# Patient Record
Sex: FEMALE | Race: WHITE | NOT HISPANIC OR LATINO | Employment: FULL TIME | URBAN - METROPOLITAN AREA
[De-identification: names, ages, dates, MRNs, and addresses within clinical notes are randomized per-mention and may not be internally consistent; named-entity substitution may affect disease eponyms.]

---

## 2023-05-04 ENCOUNTER — OFFICE VISIT (OUTPATIENT)
Dept: PRIMARY CARE | Facility: CLINIC | Age: 53
End: 2023-05-04
Payer: COMMERCIAL

## 2023-05-04 VITALS
HEART RATE: 76 BPM | BODY MASS INDEX: 27.1 KG/M2 | WEIGHT: 168.6 LBS | DIASTOLIC BLOOD PRESSURE: 87 MMHG | TEMPERATURE: 97.3 F | HEIGHT: 66 IN | SYSTOLIC BLOOD PRESSURE: 123 MMHG

## 2023-05-04 DIAGNOSIS — Z01.818 PREOP EXAMINATION: Primary | ICD-10-CM

## 2023-05-04 DIAGNOSIS — Z13.21 ENCOUNTER FOR VITAMIN DEFICIENCY SCREENING: ICD-10-CM

## 2023-05-04 DIAGNOSIS — Z13.29 SCREENING FOR THYROID DISORDER: ICD-10-CM

## 2023-05-04 DIAGNOSIS — Z00.00 HEALTH MAINTENANCE EXAMINATION: ICD-10-CM

## 2023-05-04 PROCEDURE — 99214 OFFICE O/P EST MOD 30 MIN: CPT | Performed by: NURSE PRACTITIONER

## 2023-05-04 PROCEDURE — 1036F TOBACCO NON-USER: CPT | Performed by: NURSE PRACTITIONER

## 2023-05-04 RX ORDER — POLYETHYLENE GLYCOL 3350, SODIUM CHLORIDE, SODIUM BICARBONATE, POTASSIUM CHLORIDE 420; 11.2; 5.72; 1.48 G/4L; G/4L; G/4L; G/4L
POWDER, FOR SOLUTION ORAL
COMMUNITY
Start: 2023-05-01 | End: 2023-12-04 | Stop reason: WASHOUT

## 2023-05-04 RX ORDER — BALSALAZIDE DISODIUM 750 MG/1
CAPSULE ORAL 3 TIMES DAILY
COMMUNITY
Start: 2021-02-01

## 2023-05-04 NOTE — PROGRESS NOTES
Subjective Korinne Peetz is a 52 y.o. female who presents to the office today for a preoperative consultation at the request of surgeon Leonid who plans on performing Right hip total hip on May 22. This consultation is requested for the specific conditions prompting preoperative evaluation (i.e. because of potential effect on operative risk):  The patient has the following known anesthesia issues:  None . Patients bleeding risk: no recent abnormal bleeding. Patient does not have objections to receiving blood products if needed.  To have colonoscopy in August.    Review of Systems   All other systems reviewed and are negative.    Objective   Physical Exam  Vitals and nursing note reviewed.   Constitutional:       Appearance: Normal appearance. She is obese.   HENT:      Head: Normocephalic and atraumatic.      Right Ear: Tympanic membrane, ear canal and external ear normal.      Left Ear: Tympanic membrane, ear canal and external ear normal.      Nose: Nose normal.      Mouth/Throat:      Mouth: Mucous membranes are moist.      Pharynx: Oropharynx is clear.   Eyes:      Conjunctiva/sclera: Conjunctivae normal.      Pupils: Pupils are equal, round, and reactive to light.   Neck:      Thyroid: No thyroid mass, thyromegaly or thyroid tenderness.   Cardiovascular:      Rate and Rhythm: Normal rate and regular rhythm.      Pulses: Normal pulses.      Heart sounds: Normal heart sounds.   Pulmonary:      Effort: Pulmonary effort is normal.      Breath sounds: Normal breath sounds.   Abdominal:      General: Bowel sounds are normal.      Palpations: Abdomen is soft.   Genitourinary:     Comments: Defer  Musculoskeletal:      Cervical back: Normal range of motion and neck supple.      Comments: Right hip with stiffness and decreased ROM noted   Lymphadenopathy:      Cervical: No cervical adenopathy.   Skin:     General: Skin is warm.      Capillary Refill: Capillary refill takes 2 to 3 seconds.      Findings: No rash.    Neurological:      Mental Status: She is alert and oriented to person, place, and time. Mental status is at baseline.   Psychiatric:         Mood and Affect: Mood normal.         Behavior: Behavior normal.         Thought Content: Thought content normal.         Judgment: Judgment normal.          Predictors of intubation difficulty:   Morbid obesity? no   Anatomically abnormal facies? no   Prominent incisors? no   Receding mandible? no   Short, thick neck? no   Neck range of motion: normal   Mallampati score: II (hard and soft palate, upper portion of tonsils anduvula visible)   Dentition: No chipped, loose, or missing teeth.        Lab Review   Labs ordered today    Assessment/Plan   52 y.o. female with planned surgery as above.    Known risk factors for perioperative complications: None    Difficulty with intubation is not anticipated.    Cardiac Risk Estimation: Low    Current medications which may produce withdrawal symptoms if withheld perioperatively: N/A    1. Preoperative workup as follows hemoglobin, hematocrit, electrolytes, creatinine, glucose, liver function studies, coagulation studies.  2. Change in medication regimen before surgery:  discontinue NSAIDs 14 days before surgery.  3. Prophylaxis for cardiac events with perioperative beta-blockers: should be considered, specific regimen per anesthesia.  4. Invasive hemodynamic monitoring perioperatively: not indicated.  5. Deep vein thrombosis prophylaxis postoperatively:regimen to be chosen by surgical team.  6. Surveillance for postoperative MI with ECG immediately postoperatively and on postoperative days 1 and 2 AND troponin levels 24 hours postoperatively and on day 4 or hospital discharge (whichever comes first): not indicated.  7. Other measures: N/A

## 2023-05-06 ENCOUNTER — LAB (OUTPATIENT)
Dept: LAB | Facility: LAB | Age: 53
End: 2023-05-06
Payer: COMMERCIAL

## 2023-05-06 DIAGNOSIS — Z13.29 SCREENING FOR THYROID DISORDER: ICD-10-CM

## 2023-05-06 DIAGNOSIS — Z00.00 HEALTH MAINTENANCE EXAMINATION: ICD-10-CM

## 2023-05-06 DIAGNOSIS — Z13.21 ENCOUNTER FOR VITAMIN DEFICIENCY SCREENING: ICD-10-CM

## 2023-05-06 DIAGNOSIS — Z01.818 PREOP EXAMINATION: ICD-10-CM

## 2023-05-06 LAB
ALANINE AMINOTRANSFERASE (SGPT) (U/L) IN SER/PLAS: 7 U/L (ref 7–45)
ALBUMIN (G/DL) IN SER/PLAS: 4.5 G/DL (ref 3.4–5)
ALKALINE PHOSPHATASE (U/L) IN SER/PLAS: 52 U/L (ref 33–110)
ANION GAP IN SER/PLAS: 11 MMOL/L (ref 10–20)
ASPARTATE AMINOTRANSFERASE (SGOT) (U/L) IN SER/PLAS: 15 U/L (ref 9–39)
BASOPHILS (10*3/UL) IN BLOOD BY AUTOMATED COUNT: 0.01 X10E9/L (ref 0–0.1)
BASOPHILS/100 LEUKOCYTES IN BLOOD BY AUTOMATED COUNT: 0.2 % (ref 0–2)
BILIRUBIN TOTAL (MG/DL) IN SER/PLAS: 0.7 MG/DL (ref 0–1.2)
CALCIDIOL (25 OH VITAMIN D3) (NG/ML) IN SER/PLAS: 42 NG/ML
CALCIUM (MG/DL) IN SER/PLAS: 9.7 MG/DL (ref 8.6–10.6)
CARBON DIOXIDE, TOTAL (MMOL/L) IN SER/PLAS: 29 MMOL/L (ref 21–32)
CHLORIDE (MMOL/L) IN SER/PLAS: 104 MMOL/L (ref 98–107)
CHOLESTEROL (MG/DL) IN SER/PLAS: 226 MG/DL (ref 0–199)
CHOLESTEROL IN HDL (MG/DL) IN SER/PLAS: 58.7 MG/DL
CHOLESTEROL/HDL RATIO: 3.9
CREATININE (MG/DL) IN SER/PLAS: 0.67 MG/DL (ref 0.5–1.05)
EOSINOPHILS (10*3/UL) IN BLOOD BY AUTOMATED COUNT: 0.06 X10E9/L (ref 0–0.7)
EOSINOPHILS/100 LEUKOCYTES IN BLOOD BY AUTOMATED COUNT: 1.3 % (ref 0–6)
ERYTHROCYTE DISTRIBUTION WIDTH (RATIO) BY AUTOMATED COUNT: 12.2 % (ref 11.5–14.5)
ERYTHROCYTE MEAN CORPUSCULAR HEMOGLOBIN CONCENTRATION (G/DL) BY AUTOMATED: 33.2 G/DL (ref 32–36)
ERYTHROCYTE MEAN CORPUSCULAR VOLUME (FL) BY AUTOMATED COUNT: 94 FL (ref 80–100)
ERYTHROCYTES (10*6/UL) IN BLOOD BY AUTOMATED COUNT: 4.57 X10E12/L (ref 4–5.2)
GFR FEMALE: >90 ML/MIN/1.73M2
GLUCOSE (MG/DL) IN SER/PLAS: 93 MG/DL (ref 74–99)
HEMATOCRIT (%) IN BLOOD BY AUTOMATED COUNT: 42.8 % (ref 36–46)
HEMOGLOBIN (G/DL) IN BLOOD: 14.2 G/DL (ref 12–16)
IMMATURE GRANULOCYTES/100 LEUKOCYTES IN BLOOD BY AUTOMATED COUNT: 0.2 % (ref 0–0.9)
INR IN PPP BY COAGULATION ASSAY: 1 (ref 0.9–1.1)
LDL: 150 MG/DL (ref 0–99)
LEUKOCYTES (10*3/UL) IN BLOOD BY AUTOMATED COUNT: 4.7 X10E9/L (ref 4.4–11.3)
LYMPHOCYTES (10*3/UL) IN BLOOD BY AUTOMATED COUNT: 1.85 X10E9/L (ref 1.2–4.8)
LYMPHOCYTES/100 LEUKOCYTES IN BLOOD BY AUTOMATED COUNT: 39.8 % (ref 13–44)
MONOCYTES (10*3/UL) IN BLOOD BY AUTOMATED COUNT: 0.45 X10E9/L (ref 0.1–1)
MONOCYTES/100 LEUKOCYTES IN BLOOD BY AUTOMATED COUNT: 9.7 % (ref 2–10)
NEUTROPHILS (10*3/UL) IN BLOOD BY AUTOMATED COUNT: 2.27 X10E9/L (ref 1.2–7.7)
NEUTROPHILS/100 LEUKOCYTES IN BLOOD BY AUTOMATED COUNT: 48.8 % (ref 40–80)
NRBC (PER 100 WBCS) BY AUTOMATED COUNT: 0 /100 WBC (ref 0–0)
PLATELETS (10*3/UL) IN BLOOD AUTOMATED COUNT: 328 X10E9/L (ref 150–450)
POTASSIUM (MMOL/L) IN SER/PLAS: 4 MMOL/L (ref 3.5–5.3)
PROTEIN TOTAL: 6.9 G/DL (ref 6.4–8.2)
PROTHROMBIN TIME (PT) IN PPP BY COAGULATION ASSAY: 12 SEC (ref 9.8–13.4)
SODIUM (MMOL/L) IN SER/PLAS: 140 MMOL/L (ref 136–145)
THYROTROPIN (MIU/L) IN SER/PLAS BY DETECTION LIMIT <= 0.05 MIU/L: 1.59 MIU/L (ref 0.44–3.98)
TRIGLYCERIDE (MG/DL) IN SER/PLAS: 86 MG/DL (ref 0–149)
UREA NITROGEN (MG/DL) IN SER/PLAS: 12 MG/DL (ref 6–23)
VLDL: 17 MG/DL (ref 0–40)

## 2023-05-06 PROCEDURE — 80061 LIPID PANEL: CPT

## 2023-05-06 PROCEDURE — 82306 VITAMIN D 25 HYDROXY: CPT

## 2023-05-06 PROCEDURE — 36415 COLL VENOUS BLD VENIPUNCTURE: CPT

## 2023-05-06 PROCEDURE — 85610 PROTHROMBIN TIME: CPT

## 2023-05-06 PROCEDURE — 85025 COMPLETE CBC W/AUTO DIFF WBC: CPT

## 2023-05-06 PROCEDURE — 84443 ASSAY THYROID STIM HORMONE: CPT

## 2023-05-06 PROCEDURE — 80053 COMPREHEN METABOLIC PANEL: CPT

## 2023-05-09 PROBLEM — Z00.00 HEALTH MAINTENANCE EXAMINATION: Status: ACTIVE | Noted: 2023-05-09

## 2023-05-09 PROBLEM — Z01.818 PREOP EXAMINATION: Status: ACTIVE | Noted: 2023-05-09

## 2023-05-09 PROBLEM — Z13.29 SCREENING FOR THYROID DISORDER: Status: ACTIVE | Noted: 2023-05-09

## 2023-05-09 PROBLEM — Z13.21 ENCOUNTER FOR VITAMIN DEFICIENCY SCREENING: Status: ACTIVE | Noted: 2023-05-09

## 2023-05-15 ENCOUNTER — TELEPHONE (OUTPATIENT)
Dept: PRIMARY CARE | Facility: CLINIC | Age: 53
End: 2023-05-15
Payer: COMMERCIAL

## 2023-05-15 PROBLEM — M16.9 OSTEOARTHRITIS OF HIP: Status: ACTIVE | Noted: 2023-05-15

## 2023-05-15 PROBLEM — E55.9 VITAMIN D DEFICIENCY: Status: ACTIVE | Noted: 2023-05-15

## 2023-05-15 PROBLEM — M25.551 PAIN IN RIGHT HIP: Status: ACTIVE | Noted: 2019-12-10

## 2023-05-15 PROBLEM — M76.01 GLUTEAL TENDINITIS OF RIGHT BUTTOCK: Status: ACTIVE | Noted: 2023-05-15

## 2023-05-15 PROBLEM — M25.50 JOINT PAIN: Status: ACTIVE | Noted: 2023-05-15

## 2023-05-15 PROBLEM — R19.4 CHANGE IN BOWEL HABIT: Status: ACTIVE | Noted: 2023-05-15

## 2023-05-15 PROBLEM — F41.9 ANXIETY: Status: ACTIVE | Noted: 2023-05-15

## 2023-05-15 PROBLEM — K51.90 ULCERATIVE COLITIS (MULTI): Status: ACTIVE | Noted: 2023-05-15

## 2023-05-15 RX ORDER — CHLORHEXIDINE GLUCONATE ORAL RINSE 1.2 MG/ML
SOLUTION DENTAL
COMMUNITY
Start: 2023-05-05 | End: 2023-12-04 | Stop reason: WASHOUT

## 2023-05-15 NOTE — TELEPHONE ENCOUNTER
Left message with Dr Jaquez's office to let us know if they did not receive the pre-op clearance for Korinne and I will fax over her OV/labs

## 2023-05-15 NOTE — TELEPHONE ENCOUNTER
----- Message from LEANDRA Arriaga sent at 5/14/2023 10:31 PM EDT -----  Regarding: Surgical Clearance  Please reach out to Dr. Jaquez's office and be sure that they got her pre-op clearance.  THANKS  OK to fax note and labs if they did not.

## 2023-05-22 ENCOUNTER — HOSPITAL ENCOUNTER (OUTPATIENT)
Dept: DATA CONVERSION | Facility: HOSPITAL | Age: 53
End: 2023-05-22
Attending: ORTHOPAEDIC SURGERY | Admitting: ORTHOPAEDIC SURGERY
Payer: COMMERCIAL

## 2023-05-22 DIAGNOSIS — Z90.710 ACQUIRED ABSENCE OF BOTH CERVIX AND UTERUS: ICD-10-CM

## 2023-05-22 DIAGNOSIS — M25.551 PAIN IN RIGHT HIP: ICD-10-CM

## 2023-05-22 DIAGNOSIS — F41.9 ANXIETY DISORDER, UNSPECIFIED: ICD-10-CM

## 2023-05-22 DIAGNOSIS — M16.11 UNILATERAL PRIMARY OSTEOARTHRITIS, RIGHT HIP: ICD-10-CM

## 2023-06-20 LAB
COMPLETE PATHOLOGY REPORT: NORMAL
CONVERTED CLINICAL DIAGNOSIS-HISTORY: NORMAL
CONVERTED FINAL DIAGNOSIS: NORMAL
CONVERTED FINAL REPORT PDF LINK TO COPY AND PASTE: NORMAL
CONVERTED GROSS DESCRIPTION: NORMAL

## 2023-08-01 PROBLEM — Z96.649 S/P HIP REPLACEMENT: Status: ACTIVE | Noted: 2023-08-01

## 2023-08-01 PROBLEM — M22.8X2: Status: ACTIVE | Noted: 2023-08-01

## 2023-08-01 PROBLEM — H11.31 SUBCONJUNCTIVAL HEMORRHAGE OF RIGHT EYE: Status: RESOLVED | Noted: 2023-08-01 | Resolved: 2023-08-01

## 2023-08-01 RX ORDER — GLUCOSAM/CHONDRO/HERB 149/HYAL 750-100 MG
TABLET ORAL
COMMUNITY
Start: 2021-06-08 | End: 2023-12-04 | Stop reason: WASHOUT

## 2023-08-02 ENCOUNTER — OFFICE VISIT (OUTPATIENT)
Dept: PRIMARY CARE | Facility: CLINIC | Age: 53
End: 2023-08-02
Payer: COMMERCIAL

## 2023-08-02 VITALS
WEIGHT: 170 LBS | DIASTOLIC BLOOD PRESSURE: 80 MMHG | HEIGHT: 66 IN | TEMPERATURE: 98.3 F | SYSTOLIC BLOOD PRESSURE: 124 MMHG | BODY MASS INDEX: 27.32 KG/M2

## 2023-08-02 DIAGNOSIS — M25.561 CHRONIC PAIN OF BOTH KNEES: Primary | ICD-10-CM

## 2023-08-02 DIAGNOSIS — G89.29 CHRONIC PAIN OF BOTH KNEES: Primary | ICD-10-CM

## 2023-08-02 DIAGNOSIS — M25.562 CHRONIC PAIN OF BOTH KNEES: Primary | ICD-10-CM

## 2023-08-02 PROCEDURE — 1036F TOBACCO NON-USER: CPT | Performed by: NURSE PRACTITIONER

## 2023-08-02 PROCEDURE — 99213 OFFICE O/P EST LOW 20 MIN: CPT | Performed by: NURSE PRACTITIONER

## 2023-08-02 ASSESSMENT — PATIENT HEALTH QUESTIONNAIRE - PHQ9
2. FEELING DOWN, DEPRESSED OR HOPELESS: NOT AT ALL
1. LITTLE INTEREST OR PLEASURE IN DOING THINGS: NOT AT ALL
SUM OF ALL RESPONSES TO PHQ9 QUESTIONS 1 AND 2: 0

## 2023-08-02 NOTE — PROGRESS NOTES
"Subjective   Patient ID: Korinne Peetz is a 53 y.o. female who presents for Knee Pain (bilateral).    HPI   Doing PT downstairs with Tonya  Had hip replacement May 22  Knees are not behaving.  Trouble with squats, lunges.   Knees have been creaky with running.  Steps are OK - a little creakier in the AM  Motrin helps.    Thinks she will need to see Dr. Jaquez.    He did hip replacement.    Review of Systems   All other systems reviewed and are negative.      Objective   /80   Temp 36.8 °C (98.3 °F)   Ht 1.664 m (5' 5.5\")   Wt 77.1 kg (170 lb)   BMI 27.86 kg/m²     Physical Exam  Vitals and nursing note reviewed.   Constitutional:       Appearance: Normal appearance.   Musculoskeletal:         General: Tenderness present.      Comments: Antalgic gait  Decreased ROM   Neurological:      Mental Status: She is alert and oriented to person, place, and time. Mental status is at baseline.   Psychiatric:         Mood and Affect: Mood normal.         Behavior: Behavior normal.         Thought Content: Thought content normal.         Assessment/Plan   Problem List Items Addressed This Visit       Chronic pain of both knees - Primary     REFER to ORTHO     "

## 2023-08-19 PROBLEM — G89.29 CHRONIC PAIN OF BOTH KNEES: Status: ACTIVE | Noted: 2023-08-19

## 2023-08-19 PROBLEM — M25.562 CHRONIC PAIN OF BOTH KNEES: Status: ACTIVE | Noted: 2023-08-19

## 2023-08-19 PROBLEM — M25.561 CHRONIC PAIN OF BOTH KNEES: Status: ACTIVE | Noted: 2023-08-19

## 2023-08-26 PROBLEM — E66.3 OVERWEIGHT WITH BODY MASS INDEX (BMI) OF 26 TO 26.9 IN ADULT: Status: ACTIVE | Noted: 2023-08-26

## 2023-08-26 RX ORDER — AMOXICILLIN 500 MG/1
2000 CAPSULE ORAL
COMMUNITY
End: 2023-12-04 | Stop reason: WASHOUT

## 2023-09-07 VITALS — HEIGHT: 65 IN | WEIGHT: 166.67 LBS | BODY MASS INDEX: 27.77 KG/M2

## 2023-09-30 NOTE — H&P
History & Physical Reviewed:   Pregnant/Lactating:  ·  Are You Pregnant unable to answer   ·  Order Pregnancy Test order urine test   ·  Are You Currently Breastfeeding unable to answer     I have reviewed the History and Physical dated:  02-May-2023   History and Physical reviewed and relevant findings noted. Patient examined to review pertinent physical  findings.: No significant changes   Home Medications Reviewed: no changes noted   Allergies Reviewed: no changes noted       ERAS (Enhanced Recovery After Surgery):  ·  ERAS Patient: no     Consent:   COVID-19 Consent:  ·  COVID-19 Risk Consent Surgeon has reviewed key risks related to the risk of radha COVID-19 and if they contract COVID-19 what the risks are.     Attestation:   Note Completion:  I am a:  Resident/Fellow   Attending Attestation I saw and evaluated the patient.  I personally obtained the key and critical portions of the history and physical exam or was physically present for key and  critical portions performed by the resident/fellow. I reviewed the resident/fellow?s documentation and discussed the patient with the resident/fellow.  I agree with the resident/fellow?s medical decision making as documented in the note.     I personally evaluated the patient on 22-May-2023         Electronic Signatures:  Lucero Gonzalez (DO (Resident))  (Signed 22-May-2023 06:44)   Authored: History & Physical Reviewed, ERAS, Consent,  Note Completion  Miguel Jaquez)  (Signed 22-May-2023 06:58)   Authored: Note Completion   Co-Signer: History & Physical Reviewed, ERAS, Consent, Note Completion      Last Updated: 22-May-2023 06:58 by Miguel Jaquez)

## 2023-10-02 NOTE — OP NOTE
Post Operative Note:     PreOp Diagnosis: Osteoarthritis Right hip   Post-Procedure Diagnosis: Osteoarthritis Right hip   Procedure: 1. Right Direct anterior total hip arthroplasty   2.   3.   4.   5.   Surgeon: Dr. Jaquez   Resident/Fellow/Other Assistant: Saige Gonzalez   Anesthesia: spinal   Estimated Blood Loss (mL): 250cc   Specimen: yes. bone from femoral head   Complications: none   Findings: severe djd R hip   Patient Returned To/Condition: stable/PACU   Implants: genny trident II tritanium 52mm acetabular  shell, 36mm X3 poly, accolade II size 4 x 127 femoral stem, biolox delta ceramic 36m +0mm femoral head     Operative Report Dictated:  Dictation: not applicable - note contains Operative  Report   Operative Report:    Statement of medical necessity:    This is a 52-year-old female with severe degenerative disease of the right hip that has failed non operative management. the risks, benefits and alternatives of total hip arthroplasty were discussed with the patient and they signed informed consent.      DESCRIPTION OF PROCEDURE:  The patient was brought to the operative room, and anesthesia was initiated by the anesthesia team. Appropriate preoperative antibiotics were given. The patient was then secured to the Wynona table in the standard fashion. The patient was prepped and draped  in the usual sterile fashion, a time out was performed, the patient was identified by name and medical record number and the laterality and site of surgery were confirmed by all present.  Standard direct anterior approach to the hip was made. Hemostasis  was obtained with Bovie electrocautery. Anterior and superior capsulectomy was performed in the usual fashion. Femoral neck osteotomy was performed in accordance with the preoperative plan, and femoral head extracted from the acetabulum without difficulty.  There was noted to be loss of articular cartilage from the femoral head and acetabulum as well as osteophyte  formation on both the femoral and acetabular sides.     Periacetabular retractors were placed, with excellent exposure of the acetabulum being obtained. Remnants of the acetabular labrum were excised. The medial wall of the acetabulum was developed with a reamer 5 mm smaller than the preoperative plan. Reaming  then continued circumferentially. The final reamer was line to line with the the definitive implant size. Good quality bone was noted. After irrigation, the acetabular component was inserted with the appropriate degree of abduction and anteversion based  upon anatomic landmarks. Appropriate positioning was confirmed with an AP image of the pelvis and AP of the operative hip using C-arm. After terminal impaction, the highly cross-linked polyethylene liner was inserted into the acetabular component and  locking mechanism engaged in the usual fashion.    Attention was then directed to the femoral side. The femoral elevating hook was inserted. Further soft tissue release was performed to allow anterior translation of the proximal femur. This included release of the obturator internus tendon. The obturator  externus was preserved. After adequate mobilization of the femur, the medullary canal was developed with a curved curette followed by a reverse cutting rasp. The proximal femur is then prepared using sequentially larger broaches up to the definitive implant  size. The final broach was both rotationally and longitudinally stable. The hip was reduced and flouro was used to assess the implant. There was excellent fit and fill of the implant and the leg lengths looked appropriate comparing the lesser tuberosities  to the ischium on both sides. Stability was then tested; with 60 degrees of external rotation of the hip and full extension the implant was stable.     Trial implant was removed from the femur. The definitive implant was inserted. The trunnion of the femoral component was then cleaned and dried, followed  by impaction of the definitive prosthetic femoral head. The hip was reduced with normal findings  again noted.    The wound was irrigated with irrisept solution followed by normal saline. The pericapsular soft tissues were injected with ropivicaine, epinephrine, toradol,  and duramorph. The myofascia was closed using interrupted #1 polysorb, then #2 quill, followed  by skin closure using inverted 2-0 poly, 3-0 v-lock in the subcuticular layer, and perineo dressing.  Mepilex AG silver impregnated dressing was then placed. Patient was awoken from anesthesia by the anesthesia team and brought to the pacu in stable condition    Post operative plan: patient may bear weight as tolerated, anterior hip precautions, antibiotics and dvt prophylaxis per protocol, standard post operative supportive care     Statement of staff presence: I was present during all key and critical portions of the procedure and immediately available during closure.             Attestation:   Note Completion:  I am a: Resident/Fellow   Attending Attestation I was present for key portions of the procedure and the procedure lasted longer than 5 minutes.          Electronic Signatures:  Lucero Gonzalez (DO (Resident))  (Signed 22-May-2023 10:10)   Authored: Post Operative Note, Note Completion  Miguel Jaquez)  (Signed 22-May-2023 13:39)   Authored: Post Operative Note, Note Completion   Co-Signer: Post Operative Note, Note Completion      Last Updated: 22-May-2023 13:39 by Miguel Jaquez)

## 2023-10-04 ENCOUNTER — ANCILLARY PROCEDURE (OUTPATIENT)
Dept: RADIOLOGY | Facility: CLINIC | Age: 53
End: 2023-10-04
Payer: COMMERCIAL

## 2023-10-04 ENCOUNTER — OFFICE VISIT (OUTPATIENT)
Dept: ORTHOPEDIC SURGERY | Facility: CLINIC | Age: 53
End: 2023-10-04
Payer: COMMERCIAL

## 2023-10-04 DIAGNOSIS — M25.551 RIGHT HIP PAIN: Primary | ICD-10-CM

## 2023-10-04 DIAGNOSIS — M25.551 RIGHT HIP PAIN: ICD-10-CM

## 2023-10-04 PROCEDURE — 99213 OFFICE O/P EST LOW 20 MIN: CPT | Performed by: ORTHOPAEDIC SURGERY

## 2023-10-04 PROCEDURE — 73502 X-RAY EXAM HIP UNI 2-3 VIEWS: CPT | Mod: RT

## 2023-10-04 PROCEDURE — 1036F TOBACCO NON-USER: CPT | Performed by: ORTHOPAEDIC SURGERY

## 2023-10-04 PROCEDURE — 73502 X-RAY EXAM HIP UNI 2-3 VIEWS: CPT | Mod: RIGHT SIDE | Performed by: RADIOLOGY

## 2023-10-04 NOTE — PROGRESS NOTES
This is a 53 y.o. female who presents with 4 months out from right total hip.  She is doing very well, she has excellent relief of her preoperative pain no continued pain in the right hip.  No drainage from her incision no fevers no chills.  She is walking without difficulty she is not using any assist devices she has excellent relief of her symptoms.    Physical Exam    Patients' self reported past medical history, medications, allergies, surgical history, family and social history as well as a 10 point  review of systems has been documented in the new patient intake form and scanned into the patient's electronic medical record.  The intake form was reviewed by Dr Jaquez during the initial office visit and signed by Dr. Jaquez and the patient. Pertinent findings are  documented in the HPI.    A complete 10 point review of systems was performed and was negative except as noted above in HPI.     Constitutional: well developed, well nourished female in no acute distress  Psychiatric: normal mood, appropriate affect  Eyes: sclera anicteric  HENT: normocephalic/atraumatic  CV: regular rate and rhythm   Respiratory: non labored breathing  Integumentary: no rash  Neurological: moves all extremities    Right hip examination: Well-healed surgical incision erythema no drainage no pain with range of motion neurovascular tact distally    Xrays were reviewed and interpreted by me today, then show stable right total of arthroplasty no fracture dislocation or loosening    Impression/Plan: This is a 53 y.o. female status post right total hip doing well.  Weightbearing as tolerated activity as tolerated, follow-up 1 year and radiographic follow-up after that

## 2023-10-04 NOTE — LETTER
October 4, 2023     Isabella Corrigan, APRN-CNP  [address]  [facility], [address]  [facility] [address]    Patient: Korinne Peetz   YOB: 1970   Date of Visit: 10/4/2023       Dear Dr. Isabella Corrigan, ANTONIO-CNP:    Thank you for referring Korinne Peetz to me for evaluation. Below are my notes for this consultation.  If you have questions, please do not hesitate to call me. I look forward to following your patient along with you.       Sincerely,     Miguel Jaquez MD      CC: No Recipients  ______________________________________________________________________________________    This is a 53 y.o. female who presents with 4 months out from right total hip.  She is doing very well, she has excellent relief of her preoperative pain no continued pain in the right hip.  No drainage from her incision no fevers no chills.  She is walking without difficulty she is not using any assist devices she has excellent relief of her symptoms.    Physical Exam    Patients' self reported past medical history, medications, allergies, surgical history, family and social history as well as a 10 point  review of systems has been documented in the new patient intake form and scanned into the patient's electronic medical record.  The intake form was reviewed by Dr Jaquez during the initial office visit and signed by Dr. Jaquez and the patient. Pertinent findings are  documented in the HPI.    A complete 10 point review of systems was performed and was negative except as noted above in HPI.     Constitutional: well developed, well nourished female in no acute distress  Psychiatric: normal mood, appropriate affect  Eyes: sclera anicteric  HENT: normocephalic/atraumatic  CV: regular rate and rhythm   Respiratory: non labored breathing  Integumentary: no rash  Neurological: moves all extremities    Right hip examination: Well-healed surgical incision erythema no drainage no pain with range of motion neurovascular tact  distally    Xrays were reviewed and interpreted by me today, then show stable right total of arthroplasty no fracture dislocation or loosening    Impression/Plan: This is a 53 y.o. female status post right total hip doing well.  Weightbearing as tolerated activity as tolerated, follow-up 1 year and radiographic follow-up after that

## 2023-11-29 ENCOUNTER — DOCUMENTATION (OUTPATIENT)
Dept: PHYSICAL THERAPY | Facility: CLINIC | Age: 53
End: 2023-11-29
Payer: COMMERCIAL

## 2023-12-02 ASSESSMENT — ENCOUNTER SYMPTOMS
HEADACHES: 1
MYALGIAS: 1
COUGH: 1
SWEATS: 0
HEARTBURN: 0
HEMOPTYSIS: 0
RHINORRHEA: 1
WEIGHT LOSS: 0
FEVER: 0
WHEEZING: 0
SHORTNESS OF BREATH: 0
SORE THROAT: 1
CHILLS: 0

## 2023-12-04 ENCOUNTER — OFFICE VISIT (OUTPATIENT)
Dept: PRIMARY CARE | Facility: CLINIC | Age: 53
End: 2023-12-04
Payer: COMMERCIAL

## 2023-12-04 ENCOUNTER — LAB (OUTPATIENT)
Dept: LAB | Facility: LAB | Age: 53
End: 2023-12-04
Payer: COMMERCIAL

## 2023-12-04 ENCOUNTER — ANCILLARY PROCEDURE (OUTPATIENT)
Dept: RADIOLOGY | Facility: CLINIC | Age: 53
End: 2023-12-04
Payer: COMMERCIAL

## 2023-12-04 VITALS
DIASTOLIC BLOOD PRESSURE: 78 MMHG | WEIGHT: 168 LBS | SYSTOLIC BLOOD PRESSURE: 118 MMHG | TEMPERATURE: 98.1 F | BODY MASS INDEX: 27.53 KG/M2

## 2023-12-04 DIAGNOSIS — M25.551 RIGHT HIP PAIN: ICD-10-CM

## 2023-12-04 DIAGNOSIS — R05.3 CHRONIC COUGH: ICD-10-CM

## 2023-12-04 DIAGNOSIS — R05.3 CHRONIC COUGH: Primary | ICD-10-CM

## 2023-12-04 DIAGNOSIS — J06.9 URI WITH COUGH AND CONGESTION: ICD-10-CM

## 2023-12-04 LAB
BASOPHILS # BLD AUTO: 0.03 X10*3/UL (ref 0–0.1)
BASOPHILS NFR BLD AUTO: 0.4 %
EOSINOPHIL # BLD AUTO: 0.09 X10*3/UL (ref 0–0.7)
EOSINOPHIL NFR BLD AUTO: 1.3 %
ERYTHROCYTE [DISTWIDTH] IN BLOOD BY AUTOMATED COUNT: 12.7 % (ref 11.5–14.5)
ERYTHROCYTE [SEDIMENTATION RATE] IN BLOOD BY WESTERGREN METHOD: 8 MM/H (ref 0–30)
HCT VFR BLD AUTO: 39.3 % (ref 36–46)
HGB BLD-MCNC: 12.9 G/DL (ref 12–16)
IMM GRANULOCYTES # BLD AUTO: 0.01 X10*3/UL (ref 0–0.7)
IMM GRANULOCYTES NFR BLD AUTO: 0.1 % (ref 0–0.9)
LYMPHOCYTES # BLD AUTO: 2.49 X10*3/UL (ref 1.2–4.8)
LYMPHOCYTES NFR BLD AUTO: 36.1 %
MCH RBC QN AUTO: 31 PG (ref 26–34)
MCHC RBC AUTO-ENTMCNC: 32.8 G/DL (ref 32–36)
MCV RBC AUTO: 95 FL (ref 80–100)
MONOCYTES # BLD AUTO: 0.62 X10*3/UL (ref 0.1–1)
MONOCYTES NFR BLD AUTO: 9 %
NEUTROPHILS # BLD AUTO: 3.66 X10*3/UL (ref 1.2–7.7)
NEUTROPHILS NFR BLD AUTO: 53.1 %
NRBC BLD-RTO: 0 /100 WBCS (ref 0–0)
PLATELET # BLD AUTO: 343 X10*3/UL (ref 150–450)
RBC # BLD AUTO: 4.16 X10*6/UL (ref 4–5.2)
WBC # BLD AUTO: 6.9 X10*3/UL (ref 4.4–11.3)

## 2023-12-04 PROCEDURE — 85025 COMPLETE CBC W/AUTO DIFF WBC: CPT

## 2023-12-04 PROCEDURE — 1036F TOBACCO NON-USER: CPT | Performed by: NURSE PRACTITIONER

## 2023-12-04 PROCEDURE — 36415 COLL VENOUS BLD VENIPUNCTURE: CPT

## 2023-12-04 PROCEDURE — 71046 X-RAY EXAM CHEST 2 VIEWS: CPT | Mod: FY

## 2023-12-04 PROCEDURE — 85652 RBC SED RATE AUTOMATED: CPT

## 2023-12-04 PROCEDURE — 99214 OFFICE O/P EST MOD 30 MIN: CPT | Performed by: NURSE PRACTITIONER

## 2023-12-04 PROCEDURE — 71046 X-RAY EXAM CHEST 2 VIEWS: CPT | Performed by: RADIOLOGY

## 2023-12-04 RX ORDER — ALBUTEROL SULFATE 90 UG/1
1-2 AEROSOL, METERED RESPIRATORY (INHALATION)
COMMUNITY
Start: 2023-11-21 | End: 2024-04-25 | Stop reason: ALTCHOICE

## 2023-12-04 ASSESSMENT — ENCOUNTER SYMPTOMS
HEMOPTYSIS: 0
FEVER: 0
SWEATS: 0
SHORTNESS OF BREATH: 0
MYALGIAS: 1
SORE THROAT: 1
RHINORRHEA: 1
WHEEZING: 0
CHILLS: 0
HEARTBURN: 0
COUGH: 1
WEIGHT LOSS: 0
HEADACHES: 1

## 2023-12-04 ASSESSMENT — PATIENT HEALTH QUESTIONNAIRE - PHQ9
2. FEELING DOWN, DEPRESSED OR HOPELESS: NOT AT ALL
SUM OF ALL RESPONSES TO PHQ9 QUESTIONS 1 AND 2: 0
1. LITTLE INTEREST OR PLEASURE IN DOING THINGS: NOT AT ALL

## 2023-12-04 NOTE — PROGRESS NOTES
"Subjective   Patient ID: Korinne Dee Peetz is a 53 y.o. female who presents for Cough (Congestion, headache, sinus pain and pressure, body aches x's 7 weeks, went to urgent care 3 weeks ago).    Cough  This is a chronic problem. The current episode started more than 1 month ago. The problem has been waxing and waning. The problem occurs every few hours. The cough is Productive of purulent sputum. Associated symptoms include ear congestion, ear pain, headaches, myalgias, nasal congestion, postnasal drip, rhinorrhea and a sore throat. Pertinent negatives include no chest pain, chills, fever, heartburn, hemoptysis, rash, shortness of breath, sweats, weight loss or wheezing. The symptoms are aggravated by cold air and exercise. Risk factors for lung disease include animal exposure.      Has been sick for about 7 weeks.  Went to [facility] about 2 weeks ago  Fluid in ears, \"A touch of bronchitis\"  Tx with Doxy Only 7 days   Everyone at work is sick.   No fever   Lots of cough and congestion    Just using albuterol twice daily.  Usually goes away on it's own  Fatigue and headache.  At night coughs  Has taken Sudafed  1/2 Benadryl.  Tylenol, Motrin, Mucinex  2 students in class with RSV  S/p  right hip replacement and leg has been hurting in front of thigh and into joint.    Review of Systems   Constitutional:  Negative for chills, fever and weight loss.   HENT:  Positive for ear pain, postnasal drip, rhinorrhea and sore throat.    Respiratory:  Positive for cough. Negative for hemoptysis, shortness of breath and wheezing.    Cardiovascular:  Negative for chest pain.   Gastrointestinal:  Negative for heartburn.   Musculoskeletal:  Positive for myalgias.   Skin:  Negative for rash.   Neurological:  Positive for headaches.   All other systems reviewed and are negative.      Objective   /78   Temp 36.7 °C (98.1 °F)   Wt 76.2 kg (168 lb)   BMI 27.53 kg/m²     Physical Exam  Vitals and nursing note reviewed. "   Constitutional:       Appearance: Normal appearance.   HENT:      Head: Normocephalic and atraumatic.      Right Ear: Ear canal and external ear normal.      Left Ear: Ear canal and external ear normal.      Ears:      Comments: Fluid/air line JAYME     Nose: Congestion present.      Mouth/Throat:      Comments: + PND  Eyes:      Pupils: Pupils are equal, round, and reactive to light.   Neck:      Thyroid: No thyroid mass, thyromegaly or thyroid tenderness.   Cardiovascular:      Rate and Rhythm: Normal rate and regular rhythm.      Pulses: Normal pulses.      Heart sounds: Normal heart sounds.   Pulmonary:      Effort: Pulmonary effort is normal.      Breath sounds: Wheezing and rhonchi present.      Comments: + [place] 'snoring' in left lower lobe.    Abdominal:      General: Bowel sounds are normal.      Palpations: Abdomen is soft.   Skin:     General: Skin is warm.   Neurological:      Mental Status: She is alert and oriented to person, place, and time.   Psychiatric:         Mood and Affect: Mood normal.         Assessment/Plan   Problem List Items Addressed This Visit             ICD-10-CM    Chronic cough - Primary R05.3    Relevant Orders    XR chest 2 views (Completed)    CBC and Auto Differential (Completed)    Right hip pain M25.551    Relevant Orders    Sedimentation Rate (Completed)    URI with cough and congestion J06.9    Relevant Orders    CBC and Auto Differential (Completed)

## 2023-12-05 DIAGNOSIS — J40 SINOBRONCHITIS: ICD-10-CM

## 2023-12-05 DIAGNOSIS — J32.9 SINOBRONCHITIS: Primary | ICD-10-CM

## 2023-12-05 DIAGNOSIS — J32.9 SINOBRONCHITIS: ICD-10-CM

## 2023-12-05 DIAGNOSIS — J40 SINOBRONCHITIS: Primary | ICD-10-CM

## 2023-12-05 PROBLEM — R05.3 CHRONIC COUGH: Status: ACTIVE | Noted: 2023-12-05

## 2023-12-05 PROBLEM — J06.9 URI WITH COUGH AND CONGESTION: Status: ACTIVE | Noted: 2023-12-05

## 2023-12-05 RX ORDER — PREDNISONE 20 MG/1
TABLET ORAL
Qty: 18 TABLET | Refills: 0 | Status: SHIPPED | OUTPATIENT
Start: 2023-12-05 | End: 2024-05-14 | Stop reason: WASHOUT

## 2023-12-05 RX ORDER — PREDNISONE 20 MG/1
TABLET ORAL
Qty: 18 TABLET | Refills: 0 | Status: SHIPPED | OUTPATIENT
Start: 2023-12-05 | End: 2023-12-05 | Stop reason: SDUPTHER

## 2023-12-05 RX ORDER — AMOXICILLIN AND CLAVULANATE POTASSIUM 875; 125 MG/1; MG/1
875 TABLET, FILM COATED ORAL 2 TIMES DAILY
Qty: 20 TABLET | Refills: 0 | Status: SHIPPED | OUTPATIENT
Start: 2023-12-05 | End: 2023-12-15

## 2023-12-06 ENCOUNTER — APPOINTMENT (OUTPATIENT)
Dept: RADIOLOGY | Facility: CLINIC | Age: 53
End: 2023-12-06
Payer: COMMERCIAL

## 2023-12-12 ENCOUNTER — ANCILLARY PROCEDURE (OUTPATIENT)
Dept: RADIOLOGY | Facility: CLINIC | Age: 53
End: 2023-12-12
Payer: COMMERCIAL

## 2023-12-12 DIAGNOSIS — M25.562 CHRONIC PAIN OF BOTH KNEES: ICD-10-CM

## 2023-12-12 DIAGNOSIS — M25.551 RIGHT HIP PAIN: ICD-10-CM

## 2023-12-12 DIAGNOSIS — M25.561 CHRONIC PAIN OF BOTH KNEES: ICD-10-CM

## 2023-12-12 DIAGNOSIS — G89.29 CHRONIC PAIN OF BOTH KNEES: ICD-10-CM

## 2023-12-12 PROCEDURE — 73502 X-RAY EXAM HIP UNI 2-3 VIEWS: CPT | Mod: RIGHT SIDE | Performed by: RADIOLOGY

## 2023-12-12 PROCEDURE — 73564 X-RAY EXAM KNEE 4 OR MORE: CPT | Mod: RT

## 2023-12-12 PROCEDURE — 73564 X-RAY EXAM KNEE 4 OR MORE: CPT | Mod: RIGHT SIDE | Performed by: RADIOLOGY

## 2023-12-12 PROCEDURE — 73502 X-RAY EXAM HIP UNI 2-3 VIEWS: CPT | Mod: RT

## 2024-01-03 ENCOUNTER — ANCILLARY PROCEDURE (OUTPATIENT)
Dept: RADIOLOGY | Facility: CLINIC | Age: 54
End: 2024-01-03
Payer: COMMERCIAL

## 2024-01-03 ENCOUNTER — OFFICE VISIT (OUTPATIENT)
Dept: ORTHOPEDIC SURGERY | Facility: CLINIC | Age: 54
End: 2024-01-03
Payer: COMMERCIAL

## 2024-01-03 DIAGNOSIS — M25.551 RIGHT HIP PAIN: ICD-10-CM

## 2024-01-03 DIAGNOSIS — M17.11 PRIMARY OSTEOARTHRITIS OF RIGHT KNEE: ICD-10-CM

## 2024-01-03 DIAGNOSIS — M25.551 RIGHT HIP PAIN: Primary | ICD-10-CM

## 2024-01-03 PROCEDURE — 1036F TOBACCO NON-USER: CPT | Performed by: ORTHOPAEDIC SURGERY

## 2024-01-03 PROCEDURE — 73502 X-RAY EXAM HIP UNI 2-3 VIEWS: CPT | Mod: RT

## 2024-01-03 PROCEDURE — 20610 DRAIN/INJ JOINT/BURSA W/O US: CPT | Performed by: ORTHOPAEDIC SURGERY

## 2024-01-03 PROCEDURE — 99214 OFFICE O/P EST MOD 30 MIN: CPT | Performed by: ORTHOPAEDIC SURGERY

## 2024-01-03 PROCEDURE — 73502 X-RAY EXAM HIP UNI 2-3 VIEWS: CPT | Mod: RIGHT SIDE | Performed by: RADIOLOGY

## 2024-01-03 RX ORDER — TRIAMCINOLONE ACETONIDE 40 MG/ML
1 INJECTION, SUSPENSION INTRA-ARTICULAR; INTRAMUSCULAR
Status: COMPLETED | OUTPATIENT
Start: 2024-01-03 | End: 2024-01-03

## 2024-01-03 RX ADMIN — TRIAMCINOLONE ACETONIDE 1 ML: 40 INJECTION, SUSPENSION INTRA-ARTICULAR; INTRAMUSCULAR at 09:59

## 2024-01-03 NOTE — PROGRESS NOTES
This is a consultation from ANTONIO Garcia-CNP for   Chief Complaint   Patient presents with    Right Hip - Pain     5/22/23 RT [facility]   New complaint right knee pain    This is a 53 y.o. female who presents for evaluation of right hip and right knee.  She does 7 months out from a right total hip, she noticed couple of months ago when she was doing some heavy lifting that she had pain over the front of her hip.  It radiates into the front of her thigh.  No drainage from her incision no fevers no chills.  Pain is not like anything she had before surgery, it is worse when she puts on socks and gets in and out of the car or goes up stairs.  She is also pain in the knee lately, this is a new issue for her.  She complains of sharp pain over the medial knee worse with walking proving with rest.  No previous surgeries or injections in the knee.  She is taking anti-inflammatories including Motrin which is helpful.    Physical Exam    There has been no interval change in this patient's past medical, surgical, medications, allergies, family history or social history since the most recent visit to a provider within our department. 14 point review of systems was performed, reviewed, and negative except for pertinent positives documented in the history of present illness.     Constitutional: well developed, well nourished female in no acute distress  Psychiatric: normal mood, appropriate affect  Eyes: sclera anicteric  HENT: normocephalic/atraumatic  CV: regular rate and rhythm   Respiratory: non labored breathing  Integumentary: no rash  Neurological: moves all extremities    Right knee exam: skin intact no lacerations or abrations.  1+ effusion.  Tender medial joint line. negative log roll negative patellar grind. ROM 0-120. stable to varus and valgus stress at 0 and 30 degrees. negative lachman negative posterior drawer negative bunny. 5/5 ehl/fhl/gs/ta. silt s/s/sp/dp/t. 2+ dp/pt    Right hip  examination: Well-healed surgical incision erythema no drainage no pain with range of motion, reproduction of her pain with resisted flexion.  Neurovascular intact distally    Xrays were ordered by me, they were reviewed and independently interpreted by me today, they show stable right total of arthroplasty no fracture dislocation or loosening.  In the knee they show mild degenerative changes    L Inj/Asp: R knee on 1/3/2024 9:59 AM  Indications: pain and joint swelling  Details: 22 G needle, anterolateral approach  Medications: 1 mL triamcinolone acetonide 40 mg/mL    Discussion:  I discussed the conservative treatment options for knee osteoarthritis including but not limited to physical therapy, oral NSAIDS, activity and lifestyle modification, and corticosteroid injections. Pt has elected to undergo a cortisone injection today. I have explained the risk and benefits of an injection including the possibility of joint infection, bleeding, damage to cartilage, allergic reaction. Patient verbalized understanding and gave verbal consent wishes to proceed with a intra-articular cortisone injection for their knee.    Procedure:  After discussing the risk and benefits of the procedure, we proceeded with an intra-articular right knee injection. We discussed the risks and benefits and potential morbidity related to the treatment, and to the prescription medication administered in the injection    With the patient's informed verbal consent, the right knee was prepped in standard sterile fashion with Chlorhexidine. The skin was then anesthetized with ethyl chloride spray and cleaned again with Chlorhexidine. The knee was then apirated/injected with a prefilled 20-gauge syringe of 40 mg Kenalog + 4 ml Lidocaine using the lateral approach without complications.  The patient tolerated this well and felt immediate initial relief of symptoms. A bandaid was applied and the patient ambulated out of the clinic on ther own accord  without difficulty. Patient was instructed to avoid physical activity for 24-48 hours to prevent the knees from swelling and may ice the knees as tolerated. Patient should contact the office if any signs of of infection appear: redness, fever, chills, drainage, swelling or warmth to the knees.  Pt understands that the injections can be repeated no sooner than 3 months.    Procedure, treatment alternatives, risks and benefits explained, specific risks discussed. Consent was given by the patient. Immediately prior to procedure a time out was called to verify the correct patient, procedure, equipment, support staff and site/side marked as required. Patient was prepped and draped in the usual sterile fashion.             Impression/Plan: This is a 53 y.o. female status post right total hip with iliopsoas irritation.  I discussed this with her in detail occluding the clinical radiographic findings treatment options risks and benefits.  I recommend initial management with anti-inflammatories and physical therapy.  Discussed possibility of guided injection to the iliopsoas tendon.  She would like to proceed with that.    Regarding her knee, she has right knee arthritis.  I had an in depth discussion with the patient regarding treatment options for arthritis and their relative risks and benefits. We reviewed surgical and nonsurgical option for treatment. Treatments include anti inflammatory medications, physical therapy, weight loss, activity modification, use of assistive devices, injection therapies. We discussed current prescriptions and risks and benefits of continuation of prescription medication as apporpriate. We discussed that arthritis is often progressive over time, an in end stage arthritis surgical interventions can be considered, including arthroplasty. All questions were answered and the patient voiced their understanding.    BMI Readings from Last 1 Encounters:   12/04/23 27.53 kg/m²      Lab Results  "  Component Value Date    CREATININE 0.67 05/06/2023     Tobacco Use: Low Risk  (1/3/2024)    Patient History     Smoking Tobacco Use: Never     Smokeless Tobacco Use: Never     Passive Exposure: Never      MELD 3.0: 7 at 5/6/2023  8:03 AM  MELD-Na: 6 at 5/6/2023  8:03 AM  Calculated from:  Serum Creatinine: 0.67 mg/dL (Using min of 1 mg/dL) at 5/6/2023  8:03 AM  Serum Sodium: 140 mmol/L (Using max of 137 mmol/L) at 5/6/2023  8:03 AM  Total Bilirubin: 0.7 mg/dL (Using min of 1 mg/dL) at 5/6/2023  8:03 AM  Serum Albumin: 4.5 g/dL (Using max of 3.5 g/dL) at 5/6/2023  8:03 AM  INR(ratio): 1.0 at 5/6/2023  8:03 AM  Age at listing (hypothetical): 52 years  Sex: Female at 5/6/2023  8:03 AM       No results found for: \"HGBA1C\"  Lab Results   Component Value Date    STAPHMRSASCR NO Staphylococcus aureus ISOLATED. 05/05/2023     "

## 2024-01-16 ENCOUNTER — HOSPITAL ENCOUNTER (OUTPATIENT)
Dept: RADIOLOGY | Facility: HOSPITAL | Age: 54
Discharge: HOME | End: 2024-01-16
Payer: COMMERCIAL

## 2024-01-16 ENCOUNTER — ANCILLARY PROCEDURE (OUTPATIENT)
Dept: RADIOLOGY | Facility: HOSPITAL | Age: 54
End: 2024-01-16
Payer: COMMERCIAL

## 2024-01-16 DIAGNOSIS — M25.551 RIGHT HIP PAIN: ICD-10-CM

## 2024-01-16 PROCEDURE — 20551 NJX 1 TENDON ORIGIN/INSJ: CPT | Performed by: RADIOLOGY

## 2024-01-16 PROCEDURE — 2500000004 HC RX 250 GENERAL PHARMACY W/ HCPCS (ALT 636 FOR OP/ED): Performed by: RADIOLOGY

## 2024-01-16 PROCEDURE — 20611 DRAIN/INJ JOINT/BURSA W/US: CPT

## 2024-01-16 PROCEDURE — 76942 ECHO GUIDE FOR BIOPSY: CPT | Performed by: RADIOLOGY

## 2024-01-16 RX ORDER — BUPIVACAINE HYDROCHLORIDE 2.5 MG/ML
INJECTION, SOLUTION EPIDURAL; INFILTRATION; INTRACAUDAL
Status: DISPENSED
Start: 2024-01-16 | End: 2024-01-17

## 2024-01-16 RX ORDER — BUPIVACAINE HYDROCHLORIDE 2.5 MG/ML
INJECTION, SOLUTION EPIDURAL; INFILTRATION; INTRACAUDAL
Status: COMPLETED | OUTPATIENT
Start: 2024-01-16 | End: 2024-01-16

## 2024-01-16 RX ORDER — TRIAMCINOLONE ACETONIDE 40 MG/ML
INJECTION, SUSPENSION INTRA-ARTICULAR; INTRAMUSCULAR
Status: COMPLETED | OUTPATIENT
Start: 2024-01-16 | End: 2024-01-16

## 2024-01-16 RX ORDER — TRIAMCINOLONE ACETONIDE 40 MG/ML
INJECTION, SUSPENSION INTRA-ARTICULAR; INTRAMUSCULAR
Status: DISPENSED
Start: 2024-01-16 | End: 2024-01-17

## 2024-01-16 RX ORDER — SODIUM CHLORIDE 9 MG/ML
10 INJECTION, SOLUTION INTRAMUSCULAR; INTRAVENOUS; SUBCUTANEOUS EVERY 8 HOURS SCHEDULED
Status: DISCONTINUED | OUTPATIENT
Start: 2024-01-16 | End: 2024-01-17 | Stop reason: HOSPADM

## 2024-01-16 RX ADMIN — TRIAMCINOLONE ACETONIDE 40 MG: 40 INJECTION, SUSPENSION INTRA-ARTICULAR; INTRAMUSCULAR at 13:39

## 2024-01-16 RX ADMIN — BUPIVACAINE HYDROCHLORIDE 5 ML: 2.5 INJECTION, SOLUTION EPIDURAL; INFILTRATION; INTRACAUDAL; PERINEURAL at 13:39

## 2024-01-16 NOTE — POST-PROCEDURE NOTE
Post Procedure Note:    Procedure Data and Time: [unfilled] at [unfilled]     Pre-Procedure Time Out Performed    Post-Procedure Dino Performed    Pre-Procedure Diagnosis:  Right hip pain    Post-Procedure Diagnosis:  Right hip pain    Procedure:  Ultrasound guided right iliopsoas peritendinous injection    : Gabriele Burnett MD    Assistant: None    Blood Loss: None    Specimen: None    Attestation: I performed the procedure

## 2024-04-25 ENCOUNTER — LAB (OUTPATIENT)
Dept: LAB | Facility: LAB | Age: 54
End: 2024-04-25
Payer: COMMERCIAL

## 2024-04-25 ENCOUNTER — OFFICE VISIT (OUTPATIENT)
Dept: PRIMARY CARE | Facility: CLINIC | Age: 54
End: 2024-04-25
Payer: COMMERCIAL

## 2024-04-25 VITALS
WEIGHT: 156.2 LBS | DIASTOLIC BLOOD PRESSURE: 82 MMHG | OXYGEN SATURATION: 96 % | HEART RATE: 69 BPM | BODY MASS INDEX: 25.6 KG/M2 | SYSTOLIC BLOOD PRESSURE: 119 MMHG | TEMPERATURE: 98.3 F

## 2024-04-25 DIAGNOSIS — M25.50 ARTHRALGIA OF MULTIPLE JOINTS: ICD-10-CM

## 2024-04-25 DIAGNOSIS — L30.9 DERMATITIS: ICD-10-CM

## 2024-04-25 DIAGNOSIS — K51.90 ULCERATIVE COLITIS WITHOUT COMPLICATIONS, UNSPECIFIED LOCATION (MULTI): ICD-10-CM

## 2024-04-25 DIAGNOSIS — M25.50 ARTHRALGIA OF MULTIPLE JOINTS: Primary | ICD-10-CM

## 2024-04-25 LAB
ALBUMIN SERPL BCP-MCNC: 4.5 G/DL (ref 3.4–5)
ALP SERPL-CCNC: 46 U/L (ref 33–110)
ALT SERPL W P-5'-P-CCNC: 7 U/L (ref 7–45)
ANION GAP SERPL CALC-SCNC: 11 MMOL/L (ref 10–20)
AST SERPL W P-5'-P-CCNC: 14 U/L (ref 9–39)
BASOPHILS # BLD AUTO: 0.04 X10*3/UL (ref 0–0.1)
BASOPHILS NFR BLD AUTO: 0.6 %
BILIRUB SERPL-MCNC: 0.4 MG/DL (ref 0–1.2)
BUN SERPL-MCNC: 14 MG/DL (ref 6–23)
CALCIUM SERPL-MCNC: 9.3 MG/DL (ref 8.6–10.3)
CHLORIDE SERPL-SCNC: 104 MMOL/L (ref 98–107)
CO2 SERPL-SCNC: 28 MMOL/L (ref 21–32)
CREAT SERPL-MCNC: 0.6 MG/DL (ref 0.5–1.05)
EGFRCR SERPLBLD CKD-EPI 2021: >90 ML/MIN/1.73M*2
EOSINOPHIL # BLD AUTO: 0.1 X10*3/UL (ref 0–0.7)
EOSINOPHIL NFR BLD AUTO: 1.5 %
ERYTHROCYTE [DISTWIDTH] IN BLOOD BY AUTOMATED COUNT: 13 % (ref 11.5–14.5)
ERYTHROCYTE [SEDIMENTATION RATE] IN BLOOD BY WESTERGREN METHOD: 7 MM/H (ref 0–30)
GLUCOSE SERPL-MCNC: 83 MG/DL (ref 74–99)
HCT VFR BLD AUTO: 42.3 % (ref 36–46)
HGB BLD-MCNC: 13.5 G/DL (ref 12–16)
IMM GRANULOCYTES # BLD AUTO: 0.02 X10*3/UL (ref 0–0.7)
IMM GRANULOCYTES NFR BLD AUTO: 0.3 % (ref 0–0.9)
LYMPHOCYTES # BLD AUTO: 2.05 X10*3/UL (ref 1.2–4.8)
LYMPHOCYTES NFR BLD AUTO: 31.4 %
MCH RBC QN AUTO: 30.2 PG (ref 26–34)
MCHC RBC AUTO-ENTMCNC: 31.9 G/DL (ref 32–36)
MCV RBC AUTO: 95 FL (ref 80–100)
MONOCYTES # BLD AUTO: 0.61 X10*3/UL (ref 0.1–1)
MONOCYTES NFR BLD AUTO: 9.4 %
NEUTROPHILS # BLD AUTO: 3.7 X10*3/UL (ref 1.2–7.7)
NEUTROPHILS NFR BLD AUTO: 56.8 %
NRBC BLD-RTO: 0 /100 WBCS (ref 0–0)
PLATELET # BLD AUTO: 360 X10*3/UL (ref 150–450)
POTASSIUM SERPL-SCNC: 4 MMOL/L (ref 3.5–5.3)
PROT SERPL-MCNC: 7 G/DL (ref 6.4–8.2)
RBC # BLD AUTO: 4.47 X10*6/UL (ref 4–5.2)
SODIUM SERPL-SCNC: 139 MMOL/L (ref 136–145)
TSH SERPL-ACNC: 2.33 MIU/L (ref 0.44–3.98)
URATE SERPL-MCNC: 4 MG/DL (ref 2.3–6.7)
WBC # BLD AUTO: 6.5 X10*3/UL (ref 4.4–11.3)

## 2024-04-25 PROCEDURE — 86225 DNA ANTIBODY NATIVE: CPT

## 2024-04-25 PROCEDURE — 36415 COLL VENOUS BLD VENIPUNCTURE: CPT

## 2024-04-25 PROCEDURE — 99214 OFFICE O/P EST MOD 30 MIN: CPT | Performed by: NURSE PRACTITIONER

## 2024-04-25 PROCEDURE — 86235 NUCLEAR ANTIGEN ANTIBODY: CPT

## 2024-04-25 PROCEDURE — 80053 COMPREHEN METABOLIC PANEL: CPT

## 2024-04-25 PROCEDURE — 86431 RHEUMATOID FACTOR QUANT: CPT

## 2024-04-25 PROCEDURE — 84550 ASSAY OF BLOOD/URIC ACID: CPT

## 2024-04-25 PROCEDURE — 85025 COMPLETE CBC W/AUTO DIFF WBC: CPT

## 2024-04-25 PROCEDURE — 86038 ANTINUCLEAR ANTIBODIES: CPT

## 2024-04-25 PROCEDURE — 85652 RBC SED RATE AUTOMATED: CPT

## 2024-04-25 PROCEDURE — 84443 ASSAY THYROID STIM HORMONE: CPT

## 2024-04-25 RX ORDER — MELOXICAM 15 MG/1
15 TABLET ORAL DAILY
Qty: 30 TABLET | Refills: 0 | Status: SHIPPED | OUTPATIENT
Start: 2024-04-25 | End: 2024-05-31

## 2024-04-25 RX ORDER — MINERAL OIL
180 ENEMA (ML) RECTAL DAILY
COMMUNITY

## 2024-04-25 ASSESSMENT — ENCOUNTER SYMPTOMS
LOSS OF SENSATION IN FEET: 0
OCCASIONAL FEELINGS OF UNSTEADINESS: 0
DEPRESSION: 0

## 2024-04-25 ASSESSMENT — PATIENT HEALTH QUESTIONNAIRE - PHQ9
2. FEELING DOWN, DEPRESSED OR HOPELESS: NOT AT ALL
1. LITTLE INTEREST OR PLEASURE IN DOING THINGS: NOT AT ALL
SUM OF ALL RESPONSES TO PHQ9 QUESTIONS 1 & 2: 0

## 2024-04-25 NOTE — PROGRESS NOTES
Subjective   Patient ID: Korinne Dee Peetz is a 53 y.o. female who presents for Allergies (Discuss, seem to get worse every year. May be causing arthritis flare-up) and Arthritis.    HPI   Allergies  started and arthritis flares up.  Hands hurt so much she cannot turn pages.  Brain fog - persists most days  Routine of muscle memory  Apthos ulcers  have been bad recently  Once saw a Rheumatologist Genoveva  Allergies in Spring and Fall  Colitis gets trickier to manage.    Age 31 had a  hysterectomy  Dtgr with similar issues - she never did workup  Pain today achy  ~ 3   3 weeks ago was 8 every day   Does not do well with Prednisone    Helps with joint pain but has bad side effects  Blows up and turns red. 11    Review of Systems   Musculoskeletal:  Positive for arthralgias.   Skin:  Positive for rash.        Erythematous maxillary rash   All other systems reviewed and are negative.      Objective   /82 (BP Location: Right arm, Patient Position: Sitting, BP Cuff Size: Adult)   Pulse 69   Temp 36.8 °C (98.3 °F) (Temporal)   Wt 70.9 kg (156 lb 3.2 oz)   SpO2 96%   BMI 25.60 kg/m²     Physical Exam  Vitals and nursing note reviewed.   Constitutional:       Appearance: Normal appearance.   HENT:      Head: Normocephalic and atraumatic.      Right Ear: Tympanic membrane, ear canal and external ear normal.      Left Ear: Tympanic membrane, ear canal and external ear normal.      Mouth/Throat:      Pharynx: Oropharynx is clear.   Neck:      Thyroid: No thyroid mass, thyromegaly or thyroid tenderness.   Cardiovascular:      Rate and Rhythm: Normal rate and regular rhythm.      Pulses: Normal pulses.      Heart sounds: Normal heart sounds.   Pulmonary:      Effort: Pulmonary effort is normal.      Breath sounds: Normal breath sounds.   Abdominal:      General: Bowel sounds are normal.      Palpations: Abdomen is soft.   Musculoskeletal:      Comments: No obvious swelling or redness at joints   Skin:     Comments:  Erythematous flat rash across Maxilla   Neurological:      Mental Status: She is alert and oriented to person, place, and time.   Psychiatric:         Mood and Affect: Mood normal.         Behavior: Behavior normal.         Thought Content: Thought content normal.         Assessment/Plan   Problem List Items Addressed This Visit           ICD-10-CM    Arthralgia of multiple joints - Primary M25.50     Labs and trial of Meloxicam  REFER to RHEUMATOLOGY         Relevant Medications    meloxicam (Mobic) 15 mg tablet    Other Relevant Orders    Arthritis Panel (CMS) (Completed)    CBC and Auto Differential (Completed)    TSH with reflex to Free T4 if abnormal (Completed)    Comprehensive Metabolic Panel (Completed)    Referral to Rheumatology    Dermatitis L30.9    Relevant Orders    Arthritis Panel (CMS) (Completed)    Referral to Rheumatology    Ulcerative colitis (Multi) K51.90        Patient was identified as a fall risk. Risk prevention instructions provided.

## 2024-04-26 LAB
ANA PATTERN: ABNORMAL
ANA SER QL HEP2 SUBST: POSITIVE
ANA TITR SER IF: ABNORMAL {TITER}
CENTROMERE B AB SER-ACNC: <0.2 AI
CHROMATIN AB SERPL-ACNC: <0.2 AI
DSDNA AB SER-ACNC: 2 IU/ML
ENA JO1 AB SER QL IA: <0.2 AI
ENA RNP AB SER IA-ACNC: <0.2 AI
ENA SCL70 AB SER QL IA: <0.2 AI
ENA SM AB SER IA-ACNC: <0.2 AI
ENA SM+RNP AB SER QL IA: <0.2 AI
ENA SS-A AB SER IA-ACNC: <0.2 AI
ENA SS-B AB SER IA-ACNC: <0.2 AI
RHEUMATOID FACT SER NEPH-ACNC: <10 IU/ML (ref 0–15)
RIBOSOMAL P AB SER-ACNC: <0.2 AI

## 2024-05-14 PROBLEM — L30.9 DERMATITIS: Status: ACTIVE | Noted: 2024-05-14

## 2024-05-14 PROBLEM — J06.9 URI WITH COUGH AND CONGESTION: Status: RESOLVED | Noted: 2023-12-05 | Resolved: 2024-05-14

## 2024-05-14 PROBLEM — R19.4 CHANGE IN BOWEL HABIT: Status: RESOLVED | Noted: 2023-05-15 | Resolved: 2024-05-14

## 2024-05-14 PROBLEM — Z01.818 PREOP EXAMINATION: Status: RESOLVED | Noted: 2023-05-09 | Resolved: 2024-05-14

## 2024-05-14 ASSESSMENT — ENCOUNTER SYMPTOMS: ARTHRALGIAS: 1

## 2024-05-14 NOTE — PATIENT INSTRUCTIONS

## 2024-05-26 DIAGNOSIS — M25.50 ARTHRALGIA OF MULTIPLE JOINTS: ICD-10-CM

## 2024-05-31 RX ORDER — MELOXICAM 15 MG/1
15 TABLET ORAL DAILY
Qty: 30 TABLET | Refills: 1 | Status: SHIPPED | OUTPATIENT
Start: 2024-05-31

## 2024-06-12 ENCOUNTER — LAB (OUTPATIENT)
Dept: LAB | Facility: LAB | Age: 54
End: 2024-06-12
Payer: COMMERCIAL

## 2024-06-12 ENCOUNTER — HOSPITAL ENCOUNTER (OUTPATIENT)
Dept: RADIOLOGY | Facility: CLINIC | Age: 54
Discharge: HOME | End: 2024-06-12
Payer: COMMERCIAL

## 2024-06-12 ENCOUNTER — APPOINTMENT (OUTPATIENT)
Dept: RHEUMATOLOGY | Facility: CLINIC | Age: 54
End: 2024-06-12
Payer: COMMERCIAL

## 2024-06-12 VITALS
HEART RATE: 74 BPM | HEIGHT: 66 IN | OXYGEN SATURATION: 98 % | WEIGHT: 155 LBS | DIASTOLIC BLOOD PRESSURE: 74 MMHG | SYSTOLIC BLOOD PRESSURE: 113 MMHG | BODY MASS INDEX: 24.91 KG/M2

## 2024-06-12 DIAGNOSIS — K51.919 ULCERATIVE COLITIS WITH COMPLICATION, UNSPECIFIED LOCATION (MULTI): Primary | ICD-10-CM

## 2024-06-12 DIAGNOSIS — M81.0 OSTEOPOROSIS, UNSPECIFIED OSTEOPOROSIS TYPE, UNSPECIFIED PATHOLOGICAL FRACTURE PRESENCE: ICD-10-CM

## 2024-06-12 DIAGNOSIS — R76.8 ANA POSITIVE: ICD-10-CM

## 2024-06-12 DIAGNOSIS — M12.9 ARTHROPATHY: ICD-10-CM

## 2024-06-12 LAB
25(OH)D3 SERPL-MCNC: 36 NG/ML (ref 30–100)
C3 SERPL-MCNC: 129 MG/DL (ref 87–200)
C4 SERPL-MCNC: 30 MG/DL (ref 10–50)
CCP IGG SERPL-ACNC: <1 U/ML
CK SERPL-CCNC: 81 U/L (ref 0–215)
URATE SERPL-MCNC: 4.1 MG/DL (ref 2.3–6.7)

## 2024-06-12 PROCEDURE — 82306 VITAMIN D 25 HYDROXY: CPT

## 2024-06-12 PROCEDURE — 1036F TOBACCO NON-USER: CPT | Performed by: INTERNAL MEDICINE

## 2024-06-12 PROCEDURE — 73130 X-RAY EXAM OF HAND: CPT | Mod: 50

## 2024-06-12 PROCEDURE — 86160 COMPLEMENT ANTIGEN: CPT

## 2024-06-12 PROCEDURE — 99204 OFFICE O/P NEW MOD 45 MIN: CPT | Performed by: INTERNAL MEDICINE

## 2024-06-12 PROCEDURE — 84550 ASSAY OF BLOOD/URIC ACID: CPT

## 2024-06-12 PROCEDURE — 86200 CCP ANTIBODY: CPT

## 2024-06-12 PROCEDURE — 82550 ASSAY OF CK (CPK): CPT

## 2024-06-12 PROCEDURE — 36415 COLL VENOUS BLD VENIPUNCTURE: CPT

## 2024-06-12 RX ORDER — CELECOXIB 200 MG/1
200 CAPSULE ORAL DAILY
Qty: 30 CAPSULE | Refills: 0 | Status: SHIPPED | OUTPATIENT
Start: 2024-06-12 | End: 2024-12-09

## 2024-06-12 ASSESSMENT — PAIN SCALES - GENERAL: PAINLEVEL: 4

## 2024-06-12 NOTE — PROGRESS NOTES
Initial Rheumatology Patient Visit    Chief Complaint:  Korinne Dee Peetz is a 53 y.o. female presenting today for New Patient Visit (INS, referral in--multiple joint pain ).    History of Presenting Problem:   52 y/o female with Hx of ulcerative colitis  and positive RENETTA present for evaluation. She report she has had UC since she was in her 20's and is currently on Colazal TID. She report she has been dealing with chronic musculoskeletal pain since she was in her mid 40's.  She was given meloxicam  which worsened her bowel issues. She report she she typically will take ibuprofen 600-800 mg as needed once daily which helps about 75%. She has tried Naprosen.  Hx of Remicade infusion from Age [age] y/o  Patient underwent surgical menopause in her late 30s    Problem List:   Patient Active Problem List   Diagnosis    Encounter for vitamin deficiency screening    Anxiety    Gluteal tendinitis of right buttock    Arthralgia of multiple joints    Osteoarthritis of hip    Right hip pain    Ulcerative (chronic) enterocolitis (Multi)    Ulcerative colitis (Multi)    Vitamin D deficiency    Other disorders of patella, left knee    S/P hip replacement    Chronic pain of both knees    Overweight with body mass index (BMI) of 26 to 26.9 in adult    Chronic cough    Dermatitis       Past Medical History:   Past Medical History:   Diagnosis Date    Arthritis 2018    Bursitis of hip     Osteoarthritis     Peptic ulceration     Subconjunctival hemorrhage of right eye 08/01/2023    Tendinitis     Ulcerative colitis, unspecified, without complications (Multi) 03/19/2022    Ulcerative colitis       Surgical History:   Past Surgical History:   Procedure Laterality Date    HIP ARTHROPLASTY  5/22/2023    HYSTERECTOMY  2001    JOINT REPLACEMENT  5/22/2023    OTHER SURGICAL HISTORY  06/12/2021    Hysterectomy        Allergies: No Known Allergies    Medications:   Current Outpatient Medications:     balsalazide (Colazal) 750 mg capsule, Take  "by mouth 3 times a day., Disp: , Rfl:     fexofenadine (Allegra) 180 mg tablet, Take 1 tablet (180 mg) by mouth once daily., Disp: , Rfl:     celecoxib (CeleBREX) 200 mg capsule, Take 1 capsule (200 mg) by mouth once daily., Disp: 30 capsule, Rfl: 0    Review of Systems:   ROS: She report 15 mins Morning stiffness, She report occasional joint pain and swelling in her fingers , Denies dry eyes and mouth, She report Hx of  oral ulcers , Denies nasal or genital ulcers, She report sun sensitivity, Denies Raynaud's phenomenon. Denies Uveitis or recent vision changes. Denies new rashes or Hx of Psoriasis, Denies alopecia,   Denies Chest pain/SOB, cough, Hx of asthma,  She report occasional low grade Fever denies /chills/sweats, Denies Fatigue, Denies weight loss  Denies abdominal pain, New onset Dyspepsia, Denies dysphasia, nausea/vomiting she report occasional diarrhea, Deneis dark/tarry stools, She report blood in stools or urine. Denies Chronic back pain.   Denies Hx of blood clot, She report occasional  Hx of easy bruising or bleeding. She report chronic  Headaches, numbness and tingling, weakness.  All other systems have been reviewed and are negative for complaint.   Grandmother with Hx of suspected psoriasis    Objective   Physical Examination:    Visit Vitals  /74 (BP Location: Left arm, Patient Position: Sitting, BP Cuff Size: Adult)   Pulse 74   Ht 1.664 m (5' 5.5\")   Wt 70.3 kg (155 lb)   SpO2 98%   BMI 25.40 kg/m²   OB Status Hysterectomy   Smoking Status Never   BSA 1.8 m²        Gen: NAD, A&O x 3  HEENT: clear sclera and conjunctiva,     Musculoskeletal:   Neck; WNL, full ROM  Shoulder: WNL, full ROM  Elbow:WNL, full ROM, no effusion noted  Wrist and fingers;no active synovitis noted, Full ROM in the Wrist , Good fist and   Knees:  No effusions or crepitation, full ROM.  Hips; WNL, full ROM, Negative Bernardo test  Ankle, Feet; WNL, full ROM    Skin: No rashes or lesions seen, no nail " changes  Neuro: A&O x3, Normal Gait    Procedures :None    Orders:  Orders Placed This Encounter   Procedures    XR hand 3+ views bilateral    XR DEXA bone density    C3 Complement    C4 Complement    Citrulline Antibody, IgG    Uric Acid    Creatine Kinase    Vitamin D 25-Hydroxy,Total (for eval of Vitamin D levels)        Provider Impression:   Assessment/Plan   Encounter Diagnoses   Name Primary?    Arthropathy     Ulcerative colitis with complication, unspecified location (Multi) Yes    RENETTA positive     Osteoporosis, unspecified osteoporosis type, unspecified pathological fracture presence       52 y/o female with Hx of ulcerative colitis  and positive RENETTA present for evaluation.  Patient has dealt with chronic musculoskeletal pain since her mid 40s.  Differential diagnoses include inflammatory arthritis due to underlying inflammatory bowel disease, arthralgias associated with early menopause, fibromyalgia.  Rule out underlying inflammatory arthritis  -Will obtain additional serologies   -obtain screening x-rays  -Will give a trial of Celebrex 200 mg daily x 10-14 days and then take every other day as needed, although this is short-term given that the medication does disrupt her GI  -Encourage patient to discuss with her GI specialist about considering Biologics for treatment of her ulcerative colitis  -Recommend a bone density  -Follow-up in 6 weeks

## 2024-06-14 ENCOUNTER — HOSPITAL ENCOUNTER (OUTPATIENT)
Dept: RADIOLOGY | Facility: CLINIC | Age: 54
Discharge: HOME | End: 2024-06-14
Payer: COMMERCIAL

## 2024-06-14 DIAGNOSIS — M81.0 OSTEOPOROSIS, UNSPECIFIED OSTEOPOROSIS TYPE, UNSPECIFIED PATHOLOGICAL FRACTURE PRESENCE: ICD-10-CM

## 2024-06-14 PROCEDURE — 77080 DXA BONE DENSITY AXIAL: CPT | Performed by: RADIOLOGY

## 2024-06-14 PROCEDURE — 77080 DXA BONE DENSITY AXIAL: CPT

## 2024-06-17 DIAGNOSIS — K51.90 ULCERATIVE COLITIS, UNSPECIFIED, WITHOUT COMPLICATIONS (MULTI): ICD-10-CM

## 2024-06-17 RX ORDER — BALSALAZIDE DISODIUM 750 MG/1
2250 CAPSULE ORAL 3 TIMES DAILY
Qty: 810 CAPSULE | Refills: 0 | Status: SHIPPED | OUTPATIENT
Start: 2024-06-17

## 2024-07-11 ENCOUNTER — APPOINTMENT (OUTPATIENT)
Dept: PRIMARY CARE | Facility: CLINIC | Age: 54
End: 2024-07-11
Payer: COMMERCIAL

## 2024-07-29 ENCOUNTER — APPOINTMENT (OUTPATIENT)
Dept: PRIMARY CARE | Facility: CLINIC | Age: 54
End: 2024-07-29
Payer: COMMERCIAL

## 2024-07-30 ENCOUNTER — APPOINTMENT (OUTPATIENT)
Dept: GASTROENTEROLOGY | Facility: CLINIC | Age: 54
End: 2024-07-30
Payer: COMMERCIAL

## 2024-07-30 ENCOUNTER — APPOINTMENT (OUTPATIENT)
Dept: RHEUMATOLOGY | Facility: CLINIC | Age: 54
End: 2024-07-30
Payer: COMMERCIAL

## 2024-07-30 VITALS — HEART RATE: 77 BPM | WEIGHT: 156 LBS | HEIGHT: 65 IN | BODY MASS INDEX: 25.99 KG/M2

## 2024-07-30 DIAGNOSIS — K51.30 ULCERATIVE RECTOSIGMOIDITIS WITHOUT COMPLICATION (MULTI): Primary | ICD-10-CM

## 2024-07-30 DIAGNOSIS — Z86.010 HISTORY OF ADENOMATOUS POLYP OF COLON: ICD-10-CM

## 2024-07-30 DIAGNOSIS — Z51.81 THERAPEUTIC DRUG MONITORING: ICD-10-CM

## 2024-07-30 DIAGNOSIS — K51.90 ULCERATIVE COLITIS, UNSPECIFIED, WITHOUT COMPLICATIONS (MULTI): ICD-10-CM

## 2024-07-30 PROCEDURE — 99214 OFFICE O/P EST MOD 30 MIN: CPT | Performed by: INTERNAL MEDICINE

## 2024-07-30 PROCEDURE — 1036F TOBACCO NON-USER: CPT | Performed by: INTERNAL MEDICINE

## 2024-07-30 PROCEDURE — 3008F BODY MASS INDEX DOCD: CPT | Performed by: INTERNAL MEDICINE

## 2024-07-30 RX ORDER — BALSALAZIDE DISODIUM 750 MG/1
2250 CAPSULE ORAL 3 TIMES DAILY
Qty: 810 CAPSULE | Refills: 3 | Status: SHIPPED | OUTPATIENT
Start: 2024-07-30

## 2024-07-30 ASSESSMENT — ENCOUNTER SYMPTOMS: SHORTNESS OF BREATH: 0

## 2024-07-30 NOTE — PROGRESS NOTES
REASON FOR VISIT:  UC   PCP (requesting provider): Isabella Corrigan, APRN-CNP.    HPI:  Korinne Dee Peetz is a 54 y.o. female with a past medical history of left-sided UC (diagnosed late 1990s) stable on Balsalazide and personal history of adenomatous colon polyps. Failed Remicade and Sulfasalazine. Colonoscopy showed (8/2023).      She continues on Balsalazide and feeling well. She reports she went to Rheumatologist. She reports she has had issues with aching joints for most of her life. This is largely unchanged and is no different from when she had procedure August. She has no GI symptoms. She was given Meloxicam and then went off this medication. She reports she did not do well with Remicade and had a lot of infections.    PSurgHx:   -Hysterectomy      FamHx: No GI cancer     Prior Endoscopy:  -Colonoscopy (8/2023): Good prep, 7 mm hepatic flexure polyp (HP), mild sigmoid diverticulosis, small EH, otherwise normal colon (rectal biopsies with chronic inactive colitis, other segmental colonic biopsies normal).  -Colonoscopy (6/2022): Good prep, two 4-7 mm ascending polyps (SSA x 2), 6 mm descending polyps (HP), small EH, otherwise normal colon (rectal biopsies showed architectural distortion due to prior injury and other segmental colonic biopsies normal).   -Colonoscopy (2/2021): Good prep, two 4-5 mm ascending polyps (lymphoid aggregates x 2), 5 mm descending polyp (HP), continuous and circumferential mild (Mcdowell 1) inflammation in the rectosigmoid up to 20 cm proximal to anal verge (cecal and ascending biopsies with chronic active colitis and rectal biopsies with chronic inactive colitis but no significant active inflammation on any biopsies), otherwise normal colon (other segmental colonic biopsies normal).  -Colonoscopy (8/2017, [facility]): No procedure report (path showed rectum biopsies with chronic active colitis and other segmental colonic biopsies normal, ascending polyp was lymphoid aggregate).  -Colonoscopy  (8/2015): Excellent prep, normal colon ( ).  -Colonoscopy (?2010): No procedure report available (path showed chronic active colitis with increase eosinophils at 30 cm biopsies).  -Colonoscopy (9/2009): UC in remission with minimal scarring in rectal vault (random colonic biopsies with focal active chronic colitis in 30% of fragments, otherwise fragments with no active inflammation and only architectural distortion).  -Colonoscopy (5/2004): Normal TI (focal active ileitis on TI biopsies), diffuse colitis in the entire colon (random colonic biopsies with focal active colitis consistent with ?Crohn's? disease).  -Colonoscopy (10/1999): Moderate inflammation from the rectum up through the transverse colon (biopsies showed chronic active colitis).     PAST MEDICAL HISTORY  Past Medical History:   Diagnosis Date    Arthritis 2018    Bursitis of hip     Osteoarthritis     Peptic ulceration     Subconjunctival hemorrhage of right eye 08/01/2023    Tendinitis     Ulcerative colitis, unspecified, without complications (Multi) 03/19/2022    Ulcerative colitis       PAST SURGICAL HISTORY  Past Surgical History:   Procedure Laterality Date    HIP ARTHROPLASTY  5/22/2023    HYSTERECTOMY  2001    JOINT REPLACEMENT  5/22/2023    OTHER SURGICAL HISTORY  06/12/2021    Hysterectomy       FAMILY HISTORY  Family History   Problem Relation Name Age of Onset    Atrial fibrillation Mother 22     Stroke Mother 22     Asthma Mother 22     Alcohol abuse Sister Esther     Other (arthralgias) Daughter      Eczema Maternal Grandmother         SOCIAL HISTORY   reports that she has never smoked. She has never been exposed to tobacco smoke. She has never used smokeless tobacco. She reports that she does not drink alcohol and does not use drugs.    REVIEW OF SYSTEMS  Review of Systems   Respiratory:  Negative for shortness of breath.    Cardiovascular:  Negative for chest pain.     A 10+ point review of systems was otherwise negative except as  noted and per HPI.    ALLERGIES  Allergies   Allergen Reactions    Meloxicam GI Upset       MEDICATIONS  Current Outpatient Medications   Medication Instructions    balsalazide (COLAZAL) 2,250 mg, oral, 3 times daily    celecoxib (CELEBREX) 200 mg, oral, Daily    fexofenadine (ALLEGRA) 180 mg, oral, Daily       VITALS  Vitals:    07/30/24 1516   Pulse: 77      Body mass index is 26.36 kg/m².    PHYSICAL EXAM  CONSTITUTIONAL: NAD, appears stated age  EYES: anicteric sclera, sclera clear  HEAD: normocephalic, atraumatic   NECK: supple   PULMONARY: CTAB  CARDIOVASCULAR: RRR, no M/R/G appreciated   ABDOMEN: soft, NTND, +BS, no rebound or guarding   MUSCULOSKELETAL: no edema  SKIN: no jaundice   PSYCHIATRIC: AOx3, appropriate insight and judgement    LABS  WBC   Date Value   04/25/2024 6.5 x10*3/uL   12/04/2023 6.9 x10*3/uL   05/06/2023 4.7 x10E9/L   05/05/2023 5.5 x10E9/L   02/28/2022 7.2 x10E9/L     Hemoglobin (g/dL)   Date Value   04/25/2024 13.5   12/04/2023 12.9   05/06/2023 14.2   05/05/2023 14.1   02/28/2022 12.9     Platelets   Date Value   04/25/2024 360 x10*3/uL   12/04/2023 343 x10*3/uL   05/06/2023 328 x10E9/L   05/05/2023 341 x10E9/L   02/28/2022 321 x10E9/L     Sodium (mmol/L)   Date Value   04/25/2024 139   05/06/2023 140   05/05/2023 140   02/28/2022 134 (L)     Potassium (mmol/L)   Date Value   04/25/2024 4.0   05/06/2023 4.0   05/05/2023 3.8   02/28/2022 3.8     Chloride (mmol/L)   Date Value   04/25/2024 104   05/06/2023 104   05/05/2023 104   02/28/2022 101     Bicarbonate (mmol/L)   Date Value   04/25/2024 28   05/06/2023 29   05/05/2023 28   02/28/2022 28     Urea Nitrogen (mg/dL)   Date Value   04/25/2024 14   05/06/2023 12   05/05/2023 12   02/28/2022 17     Creatinine (mg/dL)   Date Value   04/25/2024 0.60   05/06/2023 0.67   05/05/2023 0.63   02/28/2022 0.67     Calcium (mg/dL)   Date Value   04/25/2024 9.3   05/06/2023 9.7   05/05/2023 9.4   02/28/2022 9.1     Total Protein (g/dL)   Date Value    04/25/2024 7.0   05/06/2023 6.9   05/05/2023 6.9   02/28/2022 6.6     Bilirubin, Total (mg/dL)   Date Value   04/25/2024 0.4     Total Bilirubin (mg/dL)   Date Value   05/06/2023 0.7   05/05/2023 0.4   02/28/2022 0.4     Alkaline Phosphatase (U/L)   Date Value   04/25/2024 46   05/06/2023 52   05/05/2023 48   02/28/2022 36     ALT (U/L)   Date Value   04/25/2024 7     ALT (SGPT) (U/L)   Date Value   05/06/2023 7   05/05/2023 8   02/28/2022 8     AST (U/L)   Date Value   04/25/2024 14   05/06/2023 15   05/05/2023 13   02/28/2022 13     Glucose (mg/dL)   Date Value   04/25/2024 83   05/06/2023 93   05/05/2023 96   02/28/2022 90     CRP (mg/dL)   Date Value   02/28/2022 <0.10   02/01/2021 0.17       ASSESSMENT/PLAN  Korinne Dee Peetz is a 54 y.o. female with a past medical history of left-sided UC (diagnosed late 1990s) stable on Balsalazide and personal history of adenomatous colon polyps. Failed Remicade and Sulfasalazine. Colonoscopy showed diverticulosis, hemorrhoids, and no active inflammation (8/2023). She hs no GI symptoms and her disease is under good control based on most recent colonoscopy. She is having some arthritis issues but I do not think this is indicative of underlying uncontrolled GI inflammation. We will continue with current regimen of Balsalazide. No barriers to biologic therapy for arthritis if Rheumatology feels they need to start one.     -Continue Balsalazide 2.25 g TID  -Due for surveillance colonoscopy in 8/2025    Follow-up in the office in 1 year.    Signature: Akil Hinkle MD

## 2024-07-30 NOTE — PATIENT INSTRUCTIONS
Continue Balsalazide. No barriers to starting biologic therapy from Rheumatology for arthritis if needed. Follow-up in the office in 1 year.

## 2024-07-31 ENCOUNTER — APPOINTMENT (OUTPATIENT)
Dept: ORTHOPEDIC SURGERY | Facility: CLINIC | Age: 54
End: 2024-07-31
Payer: COMMERCIAL

## 2024-07-31 DIAGNOSIS — M17.11 PRIMARY OSTEOARTHRITIS OF RIGHT KNEE: Primary | ICD-10-CM

## 2024-07-31 PROCEDURE — 1036F TOBACCO NON-USER: CPT | Performed by: ORTHOPAEDIC SURGERY

## 2024-07-31 PROCEDURE — 20610 DRAIN/INJ JOINT/BURSA W/O US: CPT | Performed by: ORTHOPAEDIC SURGERY

## 2024-07-31 PROCEDURE — 99214 OFFICE O/P EST MOD 30 MIN: CPT | Performed by: ORTHOPAEDIC SURGERY

## 2024-07-31 RX ORDER — TRIAMCINOLONE ACETONIDE 40 MG/ML
1 INJECTION, SUSPENSION INTRA-ARTICULAR; INTRAMUSCULAR
Status: COMPLETED | OUTPATIENT
Start: 2024-07-31 | End: 2024-07-31

## 2024-07-31 NOTE — PROGRESS NOTES
This is a consultation from ANTONIO Garcia-CNP for   Chief Complaint   Patient presents with   • Right Knee - Pain       This is a 54 y.o. female who presents for follow-up for her right knee.  Patient has mild right knee arthritis, she had cortisone injection about 7 months ago.  This gave her great relief and significant improvement of her symptoms.  In the last few weeks she has had return of her symptom exacerbation of her pain, sharp stabbing pain over the medial knee worse with walking proving with rest.  No numbness or tingling no fevers or chills no shooting pain down the leg.  She also had a guided iliopsoas injection for iliopsoas tendinitis after total hip.  That improved significantly.    Physical Exam    There has been no interval change in this patient's past medical, surgical, medications, allergies, family history or social history since the most recent visit to a provider within our department. 14 point review of systems was performed, reviewed, and negative except for pertinent positives documented in the history of present illness.     Constitutional: well developed, well nourished female in no acute distress  Psychiatric: normal mood, appropriate affect  Eyes: sclera anicteric  HENT: normocephalic/atraumatic  CV: regular rate and rhythm   Respiratory: non labored breathing  Integumentary: no rash  Neurological: moves all extremities    Right knee exam: skin intact no lacerations or abrations. no effusion.  Tender medial joint line. negative log roll negative patellar grind. ROM 0-120. stable to varus and valgus stress at 0 and 30 degrees. negative lachman negative posterior drawer negative bunny. 5/5 ehl/fhl/gs/ta. silt s/s/sp/dp/t. 2+ dp/pt      L Inj/Asp: R knee on 7/31/2024 10:12 AM  Indications: pain and joint swelling  Details: 22 G needle, anterolateral approach  Medications: 1 mL triamcinolone acetonide 40 mg/mL    Discussion:  I discussed the conservative treatment options  for knee osteoarthritis including but not limited to physical therapy, oral NSAIDS, activity and lifestyle modification, and corticosteroid injections. Pt has elected to undergo a cortisone injection today. I have explained the risk and benefits of an injection including the possibility of joint infection, bleeding, damage to cartilage, allergic reaction. Patient verbalized understanding and gave verbal consent wishes to proceed with a intra-articular cortisone injection for their knee.    Procedure:  After discussing the risk and benefits of the procedure, we proceeded with an intra-articular right knee injection. We discussed the risks and benefits and potential morbidity related to the treatment, and to the prescription medication administered in the injection    With the patient's informed verbal consent, the right knee was prepped in standard sterile fashion with Chlorhexidine. The skin was then anesthetized with ethyl chloride spray and cleaned again with Chlorhexidine. The knee was then apirated/injected with a prefilled 20-gauge syringe of 40 mg Kenalog + 4 ml Lidocaine using the lateral approach without complications.  The patient tolerated this well and felt immediate initial relief of symptoms. A bandaid was applied and the patient ambulated out of the clinic on ther own accord without difficulty. Patient was instructed to avoid physical activity for 24-48 hours to prevent the knees from swelling and may ice the knees as tolerated. Patient should contact the office if any signs of of infection appear: redness, fever, chills, drainage, swelling or warmth to the knees.  Pt understands that the injections can be repeated no sooner than 3 months.    Procedure, treatment alternatives, risks and benefits explained, specific risks discussed. Consent was given by the patient. Immediately prior to procedure a time out was called to verify the correct patient, procedure, equipment, support staff and site/side  "marked as required. Patient was prepped and draped in the usual sterile fashion.         Impression/Plan: This is a 54 y.o. female with mild right knee arthritis.  I had an in depth discussion with the patient regarding treatment options for arthritis and their relative risks and benefits. We reviewed surgical and nonsurgical option for treatment. Treatments include anti inflammatory medications, physical therapy, weight loss, activity modification, use of assistive devices, injection therapies. We discussed current prescriptions and risks and benefits of continuation of prescription medication as apporpriate. We discussed that arthritis is often progressive over time, an in end stage arthritis surgical interventions can be considered, including arthroplasty. All questions were answered and the patient voiced their understanding.    Regarding her hip, she may call again in the future for ultrasound-guided iliopsoas tendon sheath injection.    BMI Readings from Last 1 Encounters:   07/30/24 26.36 kg/m²      Lab Results   Component Value Date    CREATININE 0.60 04/25/2024     Tobacco Use: Low Risk  (7/31/2024)    Patient History    • Smoking Tobacco Use: Never    • Smokeless Tobacco Use: Never    • Passive Exposure: Never      Computed MELD 3.0 unavailable. One or more values for this score either were not found within the given timeframe or did not fit some other criterion.  Computed MELD-Na unavailable. One or more values for this score either were not found within the given timeframe or did not fit some other criterion.       No results found for: \"HGBA1C\"  Lab Results   Component Value Date    STAPHMRSASCR NO Staphylococcus aureus ISOLATED. 05/05/2023     "

## 2024-08-09 ENCOUNTER — APPOINTMENT (OUTPATIENT)
Dept: PRIMARY CARE | Facility: CLINIC | Age: 54
End: 2024-08-09
Payer: COMMERCIAL

## 2024-08-14 ENCOUNTER — APPOINTMENT (OUTPATIENT)
Dept: RHEUMATOLOGY | Facility: CLINIC | Age: 54
End: 2024-08-14
Payer: COMMERCIAL

## 2024-08-14 VITALS
DIASTOLIC BLOOD PRESSURE: 79 MMHG | HEART RATE: 65 BPM | WEIGHT: 153 LBS | BODY MASS INDEX: 25.49 KG/M2 | HEIGHT: 65 IN | SYSTOLIC BLOOD PRESSURE: 131 MMHG

## 2024-08-14 DIAGNOSIS — M12.9 ARTHROPATHY: ICD-10-CM

## 2024-08-14 DIAGNOSIS — R76.8 ANA POSITIVE: Primary | ICD-10-CM

## 2024-08-14 DIAGNOSIS — M85.80 OSTEOPENIA, UNSPECIFIED LOCATION: ICD-10-CM

## 2024-08-14 PROCEDURE — 1036F TOBACCO NON-USER: CPT | Performed by: INTERNAL MEDICINE

## 2024-08-14 PROCEDURE — 99213 OFFICE O/P EST LOW 20 MIN: CPT | Performed by: INTERNAL MEDICINE

## 2024-08-14 PROCEDURE — 3008F BODY MASS INDEX DOCD: CPT | Performed by: INTERNAL MEDICINE

## 2024-08-14 RX ORDER — CALCIUM CARBONATE 200(500)MG
1 TABLET,CHEWABLE ORAL DAILY
COMMUNITY

## 2024-08-14 ASSESSMENT — ENCOUNTER SYMPTOMS
OCCASIONAL FEELINGS OF UNSTEADINESS: 0
DEPRESSION: 0
LOSS OF SENSATION IN FEET: 0

## 2024-08-14 ASSESSMENT — PAIN SCALES - GENERAL: PAINLEVEL: 0-NO PAIN

## 2024-08-14 NOTE — PROGRESS NOTES
Initial Rheumatology Patient Visit    Chief Complaint:  Korinne Dee Peetz is a 54 y.o. female presenting today for No chief complaint on file..    History of Presenting Problem:   55 y/o female with Hx of ulcerative colitis  and positive RENETTA present for evaluation. She report she has had UC since she was in her 20's and is currently on Colazal TID. She report she has been dealing with chronic musculoskeletal pain since she was in her mid 40's.  She was given meloxicam  which worsened her bowel issues. She report she she typically will take ibuprofen 600-800 mg as needed once daily which helps about 75%. She has tried Naprosen.  Hx of Remicade infusion from Age [age] y/o  Patient underwent surgical menopause in her late 30s.  Today patient reports she took Celebrex for only a couple days and stopped and currently her joint symptoms are resolved she does not have any acute joint complaints.  She did talk to hide she has specialist about her current UC symptoms but they do not feel she needs a biologic at this time  .    Problem List:   Patient Active Problem List   Diagnosis    Encounter for vitamin deficiency screening    Anxiety    Gluteal tendinitis of right buttock    Arthralgia of multiple joints    Osteoarthritis of hip    Right hip pain    Ulcerative (chronic) enterocolitis (Multi)    Ulcerative colitis (Multi)    Vitamin D deficiency    Other disorders of patella, left knee    S/P hip replacement    Chronic pain of both knees    Overweight with body mass index (BMI) of 26 to 26.9 in adult    Chronic cough    Dermatitis       Past Medical History:   Past Medical History:   Diagnosis Date    Arthritis 2018    Bursitis of hip     Osteoarthritis     Peptic ulceration     Subconjunctival hemorrhage of right eye 08/01/2023    Tendinitis     Ulcerative colitis, unspecified, without complications (Multi) 03/19/2022    Ulcerative colitis       Surgical History:   Past Surgical History:   Procedure Laterality Date     "HIP ARTHROPLASTY  5/22/2023    HYSTERECTOMY  2001    JOINT REPLACEMENT  5/22/2023    OTHER SURGICAL HISTORY  06/12/2021    Hysterectomy        Allergies:   Allergies   Allergen Reactions    Meloxicam GI Upset       Medications:   Current Outpatient Medications:     balsalazide (Colazal) 750 mg capsule, Take 3 capsules (2,250 mg) by mouth 3 times a day., Disp: 810 capsule, Rfl: 3    calcium carbonate (Tums) 200 mg calcium chewable tablet, Chew 1 tablet (500 mg) once daily. 600 mg, Disp: , Rfl:     fexofenadine (Allegra) 180 mg tablet, Take 1 tablet (180 mg) by mouth once daily., Disp: , Rfl:         Objective   Physical Examination:    Visit Vitals  /79   Pulse 65   Ht 1.638 m (5' 4.5\")   Wt 69.4 kg (153 lb)   BMI 25.86 kg/m²   OB Status Hysterectomy   Smoking Status Never   BSA 1.78 m²        Gen: NAD, A&O x 3  HEENT: clear sclera and conjunctiva,     Musculoskeletal:   Neck; WNL, full ROM  Shoulder: WNL, full ROM  Elbow:WNL, full ROM, no effusion noted  Wrist and fingers;no active synovitis noted, Full ROM in the Wrist , Good fist and   Knees:  No effusions or crepitation, full ROM.  Hips; WNL, full ROM, Negative Bernardo test  Ankle, Feet; WNL, full ROM    Skin: No rashes or lesions seen, no nail changes  Neuro: A&O x3, Normal Gait    Procedures :None    Orders:  No orders of the defined types were placed in this encounter.       Provider Impression:   Assessment/Plan   Encounter Diagnoses   Name Primary?    RENETTA positive Yes    Arthropathy     Osteopenia, unspecified location         55 y/o female with Hx of ulcerative colitis  and positive RENETTA present for evaluation.  Patient has dealt with chronic musculoskeletal pain since her mid 40s.  Differential diagnoses include inflammatory arthritis due to underlying inflammatory bowel disease, arthralgias associated with early menopause, fibromyalgia.  Current workup is unremarkable including x-rays Which shows some mild degenerative processes secondary to " osteoarthritis  -Continue current plan with minimal NSAIDs/Tylenol as needed  -Patient had a bone density which shows osteopenia her worse score was -1.4 in the left femoral neck.  No need for reabsorbable therapy at this time patient is to take calcium 600 mg twice a day and vitamin D 2000 mg units  Follow-up only as needed

## 2024-08-19 ENCOUNTER — APPOINTMENT (OUTPATIENT)
Dept: PRIMARY CARE | Facility: CLINIC | Age: 54
End: 2024-08-19
Payer: COMMERCIAL

## 2024-08-22 ENCOUNTER — APPOINTMENT (OUTPATIENT)
Dept: RADIOLOGY | Facility: CLINIC | Age: 54
End: 2024-08-22
Payer: COMMERCIAL

## 2024-08-22 DIAGNOSIS — Z12.31 SCREENING MAMMOGRAM FOR BREAST CANCER: ICD-10-CM

## 2024-09-12 ENCOUNTER — HOSPITAL ENCOUNTER (OUTPATIENT)
Dept: RADIOLOGY | Facility: CLINIC | Age: 54
Discharge: HOME | End: 2024-09-12
Payer: COMMERCIAL

## 2024-09-12 ENCOUNTER — APPOINTMENT (OUTPATIENT)
Dept: PRIMARY CARE | Facility: CLINIC | Age: 54
End: 2024-09-12
Payer: COMMERCIAL

## 2024-09-12 VITALS
WEIGHT: 149 LBS | BODY MASS INDEX: 25.18 KG/M2 | TEMPERATURE: 98.1 F | DIASTOLIC BLOOD PRESSURE: 80 MMHG | SYSTOLIC BLOOD PRESSURE: 118 MMHG

## 2024-09-12 VITALS — WEIGHT: 146 LBS | HEIGHT: 65 IN | BODY MASS INDEX: 24.32 KG/M2

## 2024-09-12 DIAGNOSIS — Z12.31 SCREENING MAMMOGRAM FOR BREAST CANCER: ICD-10-CM

## 2024-09-12 DIAGNOSIS — K51.90 ULCERATIVE COLITIS WITHOUT COMPLICATIONS, UNSPECIFIED LOCATION (MULTI): ICD-10-CM

## 2024-09-12 DIAGNOSIS — M25.50 ARTHRALGIA OF MULTIPLE JOINTS: ICD-10-CM

## 2024-09-12 PROBLEM — R10.9 ABDOMINAL PAIN: Status: ACTIVE | Noted: 2024-09-12

## 2024-09-12 PROBLEM — M22.90: Status: ACTIVE | Noted: 2024-09-12

## 2024-09-12 PROBLEM — E66.3 OVERWEIGHT WITH BODY MASS INDEX (BMI) 25.0-29.9: Status: ACTIVE | Noted: 2024-09-12

## 2024-09-12 PROBLEM — H11.30 SUBCONJUNCTIVAL HEMORRHAGE: Status: ACTIVE | Noted: 2024-09-12

## 2024-09-12 PROBLEM — Z98.890 HISTORY OF REPAIR OF HIP JOINT: Status: ACTIVE | Noted: 2024-09-12

## 2024-09-12 PROBLEM — M25.559 ARTHRALGIA OF HIP: Status: ACTIVE | Noted: 2024-09-12

## 2024-09-12 PROBLEM — R19.7 DIARRHEA: Status: ACTIVE | Noted: 2024-09-12

## 2024-09-12 PROBLEM — R19.8 ALTERED BOWEL FUNCTION: Status: ACTIVE | Noted: 2024-09-12

## 2024-09-12 PROCEDURE — 1036F TOBACCO NON-USER: CPT | Performed by: NURSE PRACTITIONER

## 2024-09-12 PROCEDURE — 99214 OFFICE O/P EST MOD 30 MIN: CPT | Performed by: NURSE PRACTITIONER

## 2024-09-12 PROCEDURE — 77067 SCR MAMMO BI INCL CAD: CPT

## 2024-09-12 ASSESSMENT — PATIENT HEALTH QUESTIONNAIRE - PHQ9
SUM OF ALL RESPONSES TO PHQ9 QUESTIONS 1 AND 2: 0
2. FEELING DOWN, DEPRESSED OR HOPELESS: NOT AT ALL
1. LITTLE INTEREST OR PLEASURE IN DOING THINGS: NOT AT ALL

## 2024-09-12 NOTE — PROGRESS NOTES
"Subjective   Patient ID: Korinne Dee Peetz is a 54 y.o. female who presents for Follow-up (Has some questions about recent appointments she has had with Rheumatology and GI.).    HPI   2 times had \"extreme joint pain\"  Left behind petechiae  Starts in hands, knees - inner aspect  Follows a Vegan diet.    Not likely to have food triggers.    Hx Ulcerative Colitis  On Remicade x 5 years   Stopped about 10 years ago    About 4 flairs that were bad but 12 where legs would not hold her up.    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   Dtgr with auto-immune disorder.   Sometimes is so bad she cannot get out of bed and cannot care for her animals.   Saw [facility] ([facility]) and did not feel her concerns were addressed.  Would like another opinion.      Unable to tolerate Meloxicam.  Review of Systems   Constitutional:  Positive for activity change.   Gastrointestinal:         Hx Ulcerative Colitis   Musculoskeletal:  Positive for arthralgias and myalgias.   Skin:  Positive for rash.   Currently not in a flare but would like to establish with a new provider for next time.      Objective   /80   Temp 36.7 °C (98.1 °F)   Wt 67.6 kg (149 lb)   BMI 25.18 kg/m²     Physical Exam  Vitals and nursing note reviewed.   Constitutional:       Appearance: Normal appearance.   Neurological:      Mental Status: She is alert and oriented to person, place, and time.   Psychiatric:         Mood and Affect: Mood normal.         Behavior: Behavior normal.         Thought Content: Thought content normal.         Judgment: Judgment normal.         Assessment/Plan   Assessment & Plan  Ulcerative colitis without complications, unspecified location (Multi)         Arthralgia of multiple joints    Orders:    Referral to Rheumatology; Future           "

## 2024-09-13 ENCOUNTER — APPOINTMENT (OUTPATIENT)
Dept: PRIMARY CARE | Facility: CLINIC | Age: 54
End: 2024-09-13
Payer: COMMERCIAL

## 2024-09-24 PROBLEM — E66.3 OVERWEIGHT WITH BODY MASS INDEX (BMI) 25.0-29.9: Status: ACTIVE | Noted: 2023-08-26

## 2024-09-24 PROBLEM — E55.9 VITAMIN D DEFICIENCY: Status: ACTIVE | Noted: 2023-05-09

## 2024-09-24 ASSESSMENT — ENCOUNTER SYMPTOMS
ARTHRALGIAS: 1
ACTIVITY CHANGE: 1
MYALGIAS: 1

## 2024-10-02 ENCOUNTER — APPOINTMENT (OUTPATIENT)
Dept: ORTHOPEDIC SURGERY | Facility: CLINIC | Age: 54
End: 2024-10-02
Payer: COMMERCIAL

## 2024-10-02 ENCOUNTER — HOSPITAL ENCOUNTER (OUTPATIENT)
Dept: RADIOLOGY | Facility: CLINIC | Age: 54
Discharge: HOME | End: 2024-10-02
Payer: COMMERCIAL

## 2024-10-02 DIAGNOSIS — M25.552 ACUTE HIP PAIN, LEFT: ICD-10-CM

## 2024-10-02 DIAGNOSIS — M70.62 GREATER TROCHANTERIC BURSITIS OF LEFT HIP: ICD-10-CM

## 2024-10-02 DIAGNOSIS — M25.552 ACUTE HIP PAIN, LEFT: Primary | ICD-10-CM

## 2024-10-02 PROCEDURE — 1036F TOBACCO NON-USER: CPT | Performed by: ORTHOPAEDIC SURGERY

## 2024-10-02 PROCEDURE — 73502 X-RAY EXAM HIP UNI 2-3 VIEWS: CPT | Mod: LT

## 2024-10-02 PROCEDURE — 20610 DRAIN/INJ JOINT/BURSA W/O US: CPT | Performed by: ORTHOPAEDIC SURGERY

## 2024-10-02 PROCEDURE — 73502 X-RAY EXAM HIP UNI 2-3 VIEWS: CPT | Mod: LEFT SIDE | Performed by: RADIOLOGY

## 2024-10-02 PROCEDURE — 99214 OFFICE O/P EST MOD 30 MIN: CPT | Performed by: ORTHOPAEDIC SURGERY

## 2024-10-02 RX ORDER — TRIAMCINOLONE ACETONIDE 40 MG/ML
1 INJECTION, SUSPENSION INTRA-ARTICULAR; INTRAMUSCULAR
Status: COMPLETED | OUTPATIENT
Start: 2024-10-02 | End: 2024-10-02

## 2024-10-02 ASSESSMENT — PAIN SCALES - GENERAL: PAINLEVEL_OUTOF10: 4

## 2024-10-02 ASSESSMENT — PAIN - FUNCTIONAL ASSESSMENT: PAIN_FUNCTIONAL_ASSESSMENT: 0-10

## 2024-10-02 NOTE — PROGRESS NOTES
This is a consultation from ANTONIO Garcia-CNP for   Chief Complaint   Patient presents with    Left Hip - Pain       This is a 54 y.o. female who presents for follow-up for her left hip.  Patient has had issues with the left hip over time but its gotten worse lately been exacerbated.  She planes of sharp pain over the lateral aspect of the hip worse with walking and also with direct pressure to the area.  No numbness or tingling no fevers no chills no shooting pain down the leg.  She is never had injections in the left hip.  She is she takes over-the-counter anti-inflammatories and has tried topical anti-inflammatory as well.    Physical Exam    There has been no interval change in this patient's past medical, surgical, medications, allergies, family history or social history since the most recent visit to a provider within our department. 14 point review of systems was performed, reviewed, and negative except for pertinent positives documented in the history of present illness.     Constitutional: well developed, well nourished female in no acute distress  Psychiatric: normal mood, appropriate affect  Eyes: sclera anicteric  HENT: normocephalic/atraumatic  CV: regular rate and rhythm   Respiratory: non labored breathing  Integumentary: no rash  Neurological: moves all extremities    Left hip exam: skin normal, no abrasions, wounds, or lacerations.  Tender over greater trochanter. negative log roll, negative ravi's test. flexion to 90 degrees without pain. no pain with flexion abduction and external rotation, no pain with flexion adduction and internal rotation. neurovascularly intact distally        Xrays were ordered by me, they were reviewed and independently interpreted by me today, they show no significant degenerative changes in the left hip.  No fracture no dislocation, well-maintained joint space.    L Inj/Asp: L greater trochanteric bursa on 10/2/2024 12:04 PM  Indications: pain  Details: 22 G  (Spinal needle  ) needle, lateral approach  Medications: 1 mL triamcinolone acetonide 40 mg/mL      Greater Trochanteric bursitis cortisone injection procedure note:  Discussion:  I discussed the conservative treatment options for Greater Trochanteric Bursitis including but not limited to physical therapy, oral NSAIDS, activity and lifestyle modification, and corticosteroid injections. Pt has elected to try a cortisone injection today. I have explained the risk and benefits of an injection including the possibility of joint infection, bleeding, damage to cartilage, allergic reaction. Patient verbalized understanding and gave verbal consent wishes to proceed with a intraarticular cortisone injection for their peribursal area. We discussed the risks and benefits and potential morbidity related to the treatment, and to the prescription medication administered in the injection    Procedure    With the patient's informed verbal consent, the left hip greater trochanteric area was prepped in standard sterile fashion with Chlorhexidine in a sidelying position. The skin was then anesthetized with ethyl chloride spray and cleaned again with Chlorhexidine. The peritrochanteric soft tissue was then injected with a prefilled spinal needle syringe of 40 mg Kenalog +4 ml Lidocaine without complications.  The patient tolerated this well and felt immediate initial relief of symptoms. A bandaid was applied and the patient ambulated out of the clinic on ther own accord without difficulty. Patient should contact the office if any signs of of infection appear: redness, fever, chills, drainage, swelling or warmth to the hip.  Pt understands that the injections can be repeated no sooner than 3 months.    Procedure, treatment alternatives, risks and benefits explained, specific risks discussed. Consent was given by the patient. Immediately prior to procedure a time out was called to verify the correct patient, procedure, equipment, support  "staff and site/side marked as required. Patient was prepped and draped in the usual sterile fashion.             Impression/Plan: This is a 54 y.o. female with left hip greater trochanteric pain syndrome.  I discussed the risks and benefits of the various treatment options with the patient in detail, treatments for greater trochanteric pain syndrome include use of oral anti-inflammatory medications, use of a cane in the opposite hand, physical therapy, activity modification, and cortisone injection.  I will see her back as needed    BMI Readings from Last 1 Encounters:   09/12/24 24.67 kg/m²      Lab Results   Component Value Date    CREATININE 0.60 04/25/2024     Tobacco Use: Low Risk  (10/2/2024)    Patient History     Smoking Tobacco Use: Never     Smokeless Tobacco Use: Never     Passive Exposure: Never      Computed MELD 3.0 unavailable. One or more values for this score either were not found within the given timeframe or did not fit some other criterion.  Computed MELD-Na unavailable. One or more values for this score either were not found within the given timeframe or did not fit some other criterion.       No results found for: \"HGBA1C\"  Lab Results   Component Value Date    STAPHMRSASCR NO Staphylococcus aureus ISOLATED. 05/05/2023     "

## 2024-10-02 NOTE — LETTER
October 2, 2024     LEANDRA Arriaga  [address]  [place], [address]  [facility] [address]    Patient: Korinne Dee Peetz   YOB: 1970   Date of Visit: 10/2/2024       Dear LEANDRA Garcia:    Thank you for referring Korinne Peetz to me for evaluation. Below are my notes for this consultation.  If you have questions, please do not hesitate to call me. I look forward to following your patient along with you.       Sincerely,     Miguel Jaquez MD      CC: No Recipients  ______________________________________________________________________________________    This is a consultation from LEANDRA Garcia for   Chief Complaint   Patient presents with   • Left Hip - Pain       This is a 54 y.o. female who presents for follow-up for her left hip.  Patient has had issues with the left hip over time but its gotten worse lately been exacerbated.  She planes of sharp pain over the lateral aspect of the hip worse with walking and also with direct pressure to the area.  No numbness or tingling no fevers no chills no shooting pain down the leg.  She is never had injections in the left hip.  She is she takes over-the-counter anti-inflammatories and has tried topical anti-inflammatory as well.    Physical Exam    There has been no interval change in this patient's past medical, surgical, medications, allergies, family history or social history since the most recent visit to a provider within our department. 14 point review of systems was performed, reviewed, and negative except for pertinent positives documented in the history of present illness.     Constitutional: well developed, well nourished female in no acute distress  Psychiatric: normal mood, appropriate affect  Eyes: sclera anicteric  HENT: normocephalic/atraumatic  CV: regular rate and rhythm   Respiratory: non labored breathing  Integumentary: no rash  Neurological: moves all extremities    Left hip exam:  skin normal, no abrasions, wounds, or lacerations.  Tender over greater trochanter. negative log roll, negative ravi's test. flexion to 90 degrees without pain. no pain with flexion abduction and external rotation, no pain with flexion adduction and internal rotation. neurovascularly intact distally        Xrays were ordered by me, they were reviewed and independently interpreted by me today, they show no significant degenerative changes in the left hip.  No fracture no dislocation, well-maintained joint space.    L Inj/Asp: L greater trochanteric bursa on 10/2/2024 12:04 PM  Indications: pain  Details: 22 G (Spinal needle  ) needle, lateral approach  Medications: 1 mL triamcinolone acetonide 40 mg/mL      Greater Trochanteric bursitis cortisone injection procedure note:  Discussion:  I discussed the conservative treatment options for Greater Trochanteric Bursitis including but not limited to physical therapy, oral NSAIDS, activity and lifestyle modification, and corticosteroid injections. Pt has elected to try a cortisone injection today. I have explained the risk and benefits of an injection including the possibility of joint infection, bleeding, damage to cartilage, allergic reaction. Patient verbalized understanding and gave verbal consent wishes to proceed with a intraarticular cortisone injection for their peribursal area. We discussed the risks and benefits and potential morbidity related to the treatment, and to the prescription medication administered in the injection    Procedure    With the patient's informed verbal consent, the left hip greater trochanteric area was prepped in standard sterile fashion with Chlorhexidine in a sidelying position. The skin was then anesthetized with ethyl chloride spray and cleaned again with Chlorhexidine. The peritrochanteric soft tissue was then injected with a prefilled spinal needle syringe of 40 mg Kenalog +4 ml Lidocaine without complications.  The patient  "tolerated this well and felt immediate initial relief of symptoms. A bandaid was applied and the patient ambulated out of the clinic on ther own accord without difficulty. Patient should contact the office if any signs of of infection appear: redness, fever, chills, drainage, swelling or warmth to the hip.  Pt understands that the injections can be repeated no sooner than 3 months.    Procedure, treatment alternatives, risks and benefits explained, specific risks discussed. Consent was given by the patient. Immediately prior to procedure a time out was called to verify the correct patient, procedure, equipment, support staff and site/side marked as required. Patient was prepped and draped in the usual sterile fashion.             Impression/Plan: This is a 54 y.o. female with left hip greater trochanteric pain syndrome.  I discussed the risks and benefits of the various treatment options with the patient in detail, treatments for greater trochanteric pain syndrome include use of oral anti-inflammatory medications, use of a cane in the opposite hand, physical therapy, activity modification, and cortisone injection.  I will see her back as needed    BMI Readings from Last 1 Encounters:   09/12/24 24.67 kg/m²      Lab Results   Component Value Date    CREATININE 0.60 04/25/2024     Tobacco Use: Low Risk  (10/2/2024)    Patient History    • Smoking Tobacco Use: Never    • Smokeless Tobacco Use: Never    • Passive Exposure: Never      Computed MELD 3.0 unavailable. One or more values for this score either were not found within the given timeframe or did not fit some other criterion.  Computed MELD-Na unavailable. One or more values for this score either were not found within the given timeframe or did not fit some other criterion.       No results found for: \"HGBA1C\"  Lab Results   Component Value Date    STAPHMRSASCR NO Staphylococcus aureus ISOLATED. 05/05/2023     "

## 2024-10-28 ENCOUNTER — APPOINTMENT (OUTPATIENT)
Dept: PHYSICAL THERAPY | Facility: CLINIC | Age: 54
End: 2024-10-28
Payer: COMMERCIAL

## 2024-10-28 DIAGNOSIS — M70.62 GREATER TROCHANTERIC BURSITIS OF LEFT HIP: ICD-10-CM

## 2024-10-28 PROCEDURE — 97161 PT EVAL LOW COMPLEX 20 MIN: CPT | Mod: GP | Performed by: PHYSICAL THERAPIST

## 2024-10-28 PROCEDURE — 97110 THERAPEUTIC EXERCISES: CPT | Mod: GP | Performed by: PHYSICAL THERAPIST

## 2024-10-28 PROCEDURE — 97014 ELECTRIC STIMULATION THERAPY: CPT | Mod: GP | Performed by: PHYSICAL THERAPIST

## 2024-10-28 ASSESSMENT — ENCOUNTER SYMPTOMS
LOSS OF SENSATION IN FEET: 0
OCCASIONAL FEELINGS OF UNSTEADINESS: 0
DEPRESSION: 0

## 2024-10-28 ASSESSMENT — PATIENT HEALTH QUESTIONNAIRE - PHQ9
1. LITTLE INTEREST OR PLEASURE IN DOING THINGS: NOT AT ALL
SUM OF ALL RESPONSES TO PHQ9 QUESTIONS 1 AND 2: 0
2. FEELING DOWN, DEPRESSED OR HOPELESS: NOT AT ALL

## 2024-11-07 ENCOUNTER — TREATMENT (OUTPATIENT)
Dept: PHYSICAL THERAPY | Facility: CLINIC | Age: 54
End: 2024-11-07
Payer: COMMERCIAL

## 2024-11-07 DIAGNOSIS — M70.62 GREATER TROCHANTERIC BURSITIS OF LEFT HIP: ICD-10-CM

## 2024-11-07 PROCEDURE — 97014 ELECTRIC STIMULATION THERAPY: CPT | Mod: GP | Performed by: PHYSICAL THERAPIST

## 2024-11-07 PROCEDURE — 97110 THERAPEUTIC EXERCISES: CPT | Mod: GP | Performed by: PHYSICAL THERAPIST

## 2024-11-07 NOTE — PROGRESS NOTES
"  Physical Therapy Treatment    Patient Name: Korinne Dee Peetz  MRN: [id number]  Today's Date: 11/7/2024  Visit 2/20  DOS/DOI:  8/1/24  Referred by:   Miguel Jaquez  Time Calculation  Start Time: 1533  Stop Time: 1615  Time Calculation (min): 42 min     PT Therapeutic Procedures Time Entry  Therapeutic Exercise Time Entry: 32  PT Modalities Time Entry  E-Stim (Unattended) Time Entry: 10              Diagnosis:   1. Greater trochanteric bursitis of left hip  Follow Up In Physical Therapy            PRECAUTIONS:  none    SUBJECTIVE:  She is doing \"great\".  0/10 pain even with ADLS.  She hasn't run this week which has helped.    HEP compliance: yes    OBJECTIVE:  - no pain with palpation of R great troch mm  - full PROM into flex and ER without pain    Treatment:  - initiated exercise for L hip and glut strengthening.  Therapeutic Exercise  Therapeutic Exercise Activity 1: S/L clamshell L with pillow at knees, 2x12  Therapeutic Exercise Activity 2: L S/L hip abd with a roll under the L ankle, 2x12  Therapeutic Exercise Activity 3: L S/L hip abd with front/back tap, 2x10  Therapeutic Exercise Activity 4: hip hike L, x12, x10  Therapeutic Exercise Activity 5: SLS L, 30 sec x2  Therapeutic Exercise Activity 6: squat 2x12  Therapeutic Exercise Activity 7: bridges  2x12           Modalities  Modality 1: Untimed ESU - Electrical Stimulation Unattended (TENS with CP to R hip, hip/low back preset, 10 min, in supine)    ASSESSMENT:  Carol rx well - she reported muscle use at the gluts vs pain during exercise.  She has progressed well and has had no pain.  She will benefit from continued therapy  to further improve glut strength for return to all ADLS such as stairs, transfers, squatting and running for exercise.      PLAN:  Advance exercise as carol.    "

## 2024-11-13 ENCOUNTER — TREATMENT (OUTPATIENT)
Dept: PHYSICAL THERAPY | Facility: CLINIC | Age: 54
End: 2024-11-13
Payer: COMMERCIAL

## 2024-11-13 DIAGNOSIS — M70.62 GREATER TROCHANTERIC BURSITIS OF LEFT HIP: ICD-10-CM

## 2024-11-13 PROCEDURE — 97110 THERAPEUTIC EXERCISES: CPT | Mod: GP | Performed by: PHYSICAL THERAPIST

## 2024-11-13 NOTE — PROGRESS NOTES
Physical Therapy Treatment    Patient Name: Korinne Dee Peetz  MRN: [id number]  Today's Date: 11/13/2024  Visit 3/20  DOS/DOI:  8/1/24  Referred by:   Miguel Jaquez  Time Calculation  Start Time: 1534  Stop Time: 1616  Time Calculation (min): 42 min     PT Therapeutic Procedures Time Entry  Therapeutic Exercise Time Entry: 42                 Diagnosis:   1. Greater trochanteric bursitis of left hip  Follow Up In Physical Therapy            PRECAUTIONS:  none    SUBJECTIVE:  She is doing really well.  0/10 pain.  She did very well after last rx.  HEP compliance: yes    OBJECTIVE:  - MMT L hip abd = 4/5, glut min = 5/5, glut med = 4-/5    Treatment:  - continued with exercise for strengthening the core and glut mm.    Therapeutic Exercise  Therapeutic Exercise Activity 1: S/L clamshell 2x12  Therapeutic Exercise Activity 2: L S/L hip abd 2x12  Therapeutic Exercise Activity 3: L S/L hip abd with front/back tap, 2x10  Therapeutic Exercise Activity 4: hip hike L 2x12  Therapeutic Exercise Activity 5: SLS B, 30 sec x2  Therapeutic Exercise Activity 6: squat 2x12  Therapeutic Exercise Activity 7: bridges 2x12  Therapeutic Exercise Activity 8: quad  alt UE/LE lift (knee in ext) B,  2x12  Therapeutic Exercise Activity 9: hip ER with ext at 45 degree ankle, B, 2x12  Therapeutic Exercise Activity 10: step down B, 6 in, 2x12  Therapeutic Exercise Activity 11: lateral step, front to back, GN TB 10 ft x 4                ASSESSMENT:  Chanel rx well - no pain with additional exercises.  She does continue to have glut med weakness on the L.  She will benefit from continued therapy to further improve glut strength for proper hip and pelvic mechanics when running.      PLAN:  Continue with current exercises.

## 2024-11-18 ENCOUNTER — TREATMENT (OUTPATIENT)
Dept: PHYSICAL THERAPY | Facility: CLINIC | Age: 54
End: 2024-11-18
Payer: COMMERCIAL

## 2024-11-18 DIAGNOSIS — M70.62 GREATER TROCHANTERIC BURSITIS OF LEFT HIP: ICD-10-CM

## 2024-11-18 PROCEDURE — 97110 THERAPEUTIC EXERCISES: CPT | Mod: GP | Performed by: PHYSICAL THERAPIST

## 2024-11-18 NOTE — PROGRESS NOTES
Physical Therapy Treatment    Patient Name: Korinne Dee Peetz  MRN: [id number]  Today's Date: 11/18/2024  Visit 4/20  DOS/DOI:  8/1/24  Referred by:   Miguel Jaquez  Time Calculation  Start Time: 1418  Stop Time: 1505  Time Calculation (min): 47 min     PT Therapeutic Procedures Time Entry  Therapeutic Exercise Time Entry: 47                 Diagnosis:   1. Greater trochanteric bursitis of left hip  Follow Up In Physical Therapy            PRECAUTIONS:  none    SUBJECTIVE:  No stiffness or pain.  She did well after her last rx.  HEP compliance: yes    OBJECTIVE:  - L hip AROM flex = 130, abd = 25  - Negative step down on R.  Slight pelvic drop and bottom of motion when on L LE.    Treatment:  - continued with exercise for B glut strength.  Progressed exercises as carol by increasing reps or adding the airex pad (SLS stance).  Therapeutic Exercise  Therapeutic Exercise Activity 1: S/L clamshell B, 3x12  Therapeutic Exercise Activity 3: L S/L hip abd with front/back tap, B 2x10  Therapeutic Exercise Activity 4: hip hike B 2x12  Therapeutic Exercise Activity 5: SLS B on airex, 30 sec x2  Therapeutic Exercise Activity 6: squat 2x12  Therapeutic Exercise Activity 7: bridges 3x12  Therapeutic Exercise Activity 8: quad alt UE/LE lift (knee in ext) B, 2x12  Therapeutic Exercise Activity 9: hip ER with ext at 45 degree angle, B, 2x12  Therapeutic Exercise Activity 10: step down B, 6 in, 2x12  Therapeutic Exercise Activity 11: lateral step, front to back, GN TB 10 ft x 4  Therapeutic Exercise Activity 12: bike, L5 5 min                ASSESSMENT:  Good carol of advanced exercise - no pain.  She demonstrates improved strength but does still have a slight pelvic drop at the bottom of the step down test when on the L LE.  Once she can consistently stabilize the pelvis in weight bearing we will begin exercises for return to running.      PLAN:  Assess L step down.  Begin running based exercises if she can stabilize the pelvis.

## 2024-11-21 ENCOUNTER — TREATMENT (OUTPATIENT)
Dept: PHYSICAL THERAPY | Facility: CLINIC | Age: 54
End: 2024-11-21
Payer: COMMERCIAL

## 2024-11-21 DIAGNOSIS — M70.62 GREATER TROCHANTERIC BURSITIS OF LEFT HIP: ICD-10-CM

## 2024-11-21 PROCEDURE — 97140 MANUAL THERAPY 1/> REGIONS: CPT | Mod: GP | Performed by: PHYSICAL THERAPIST

## 2024-11-21 PROCEDURE — 97110 THERAPEUTIC EXERCISES: CPT | Mod: GP | Performed by: PHYSICAL THERAPIST

## 2024-11-21 NOTE — PROGRESS NOTES
Physical Therapy Treatment    Patient Name: Korinne Dee Peetz  MRN: [id number]  Today's Date: 11/21/2024  Visit 5/20  DOS/DOI:  8/1/24  Referred by:   Miguel Jaquez  Time Calculation  Start Time: 1535  Stop Time: 1616  Time Calculation (min): 41 min     PT Therapeutic Procedures Time Entry  Manual Therapy Time Entry: 10  Therapeutic Exercise Time Entry: 31                 Diagnosis:   1. Greater trochanteric bursitis of left hip  Follow Up In Physical Therapy            PRECAUTIONS:  none    SUBJECTIVE:  She reports B hip soreness after last rx.  4/10  HEP compliance: yes though she skipped them this morning.    OBJECTIVE:  - tender at B piriformis    Treatment:  - continue with strengthening but reduced exercise due to reports of soreness and added manual therapy.  Therapeutic Exercise  Therapeutic Exercise Activity 1: S/L clamshell B, 3x12  Therapeutic Exercise Activity 4: hip hike B 2x12  Therapeutic Exercise Activity 5: SLS B on airex, 30 sec x1  Therapeutic Exercise Activity 6: squat 2x12  Therapeutic Exercise Activity 7: bridges 2x12  Therapeutic Exercise Activity 8: quad alt UE/LE lift (knee in ext) B, 2x12  Therapeutic Exercise Activity 9: hip ER with ext at 45 degree angle, B, 2x12  Therapeutic Exercise Activity 10: step down B, 6 in, 2x12  Therapeutic Exercise Activity 12: NuStep L1, 5 min  Manual Therapy  Manual Therapy Activity 1: B piriformis stretch  Manual Therapy Activity 2: PROM B hip, flex, ER,  Manual Therapy Activity 3: prone piriformis release B             ASSESSMENT:  Carol rx well.  She reports her hips feel warm vs painful after rx.  She will benefit from continued therapy to increase hip strength for return to running with proper form/mechanics      PLAN:  Resume all exercise as carol.

## 2024-12-03 ENCOUNTER — APPOINTMENT (OUTPATIENT)
Dept: PHYSICAL THERAPY | Facility: CLINIC | Age: 54
End: 2024-12-03
Payer: COMMERCIAL

## 2024-12-12 ENCOUNTER — APPOINTMENT (OUTPATIENT)
Dept: PHYSICAL THERAPY | Facility: CLINIC | Age: 54
End: 2024-12-12
Payer: COMMERCIAL

## 2024-12-18 ENCOUNTER — TREATMENT (OUTPATIENT)
Dept: PHYSICAL THERAPY | Facility: CLINIC | Age: 54
End: 2024-12-18
Payer: COMMERCIAL

## 2024-12-18 DIAGNOSIS — M70.62 GREATER TROCHANTERIC BURSITIS OF LEFT HIP: ICD-10-CM

## 2024-12-18 PROCEDURE — 97110 THERAPEUTIC EXERCISES: CPT | Mod: GP | Performed by: PHYSICAL THERAPIST

## 2024-12-18 NOTE — PROGRESS NOTES
Physical Therapy Treatment    Patient Name: Korinne Dee Peetz  MRN: [id number]  Today's Date: 12/18/2024  Visit 6/20  DOS/DOI:  8/1/24  Referred by:   Miguel Jaquez  Time Calculation  Start Time: 1545  Stop Time: 1629  Time Calculation (min): 44 min     PT Therapeutic Procedures Time Entry  Therapeutic Exercise Time Entry: 44                 Diagnosis:   1. Greater trochanteric bursitis of left hip  Follow Up In Physical Therapy            PRECAUTIONS:  none    SUBJECTIVE:  She is generally achy from being sick but nothing specific to the L hip.  No limitations with ADLS.  HEP compliance: yes    OBJECTIVE:  - L hip AROM flex = 128, abd = 40, ER/IR/ADD = WFL  - L hip MMT flex/ext/add = 5/5, abd = 4-/5, ER = 4/5, IR = 4+/5  - L glut met = 4/5  - Negative step down test.  No pain and good pelvic/knee position    Treatment:  -  began exercises that simulate running motions as she had a negative step down test.  Therapeutic Exercise  Therapeutic Exercise Activity 1: SLS on step with opposite LE toe tap, 2x12 B  Therapeutic Exercise Activity 2: lunge with knee drive, x10 B  Therapeutic Exercise Activity 3: SLS with running motion using cables, 50 lbs, 2x12 B  Therapeutic Exercise Activity 4: SLS step up with B UE and opposite hip ext, 30 lbs, 2x12 B  Therapeutic Exercise Activity 5: SLS med ball toss on airex, red ball, x10 B  Therapeutic Exercise Activity 7: 1 leg bridge, 2x10 B  Therapeutic Exercise Activity 8: quad alt UE/LE lift (knee in ext) B, 2x12  Therapeutic Exercise Activity 9: hip ER with ext at 45 degree angle, B, 2x12  Therapeutic Exercise Activity 10: hi pabd/ext on multi hip, L3, 2x12 B  Therapeutic Exercise Activity 12: NuStep L1, 5 min                ASSESSMENT:  Chanel rx well - no problem with advanced exercises though she was challenged.  0/10 pain after rx.  She is progressing well and has no problem with ADLS, but has not returned to running yet.  She will benefit from continued therapy to continue  walking on return to running.    Active       PT Problem       she will be independent with her HEP (Met)       Start:  10/28/24    Expected End:  11/04/24    Resolved:  12/18/24         achieve full AROM of the R hip without pain for dressing ADLS (Met)       Start:  10/28/24    Expected End:  11/11/24    Resolved:  12/18/24         achieve full WB without pain so she can amb with a normal gait without pain. (Met)       Start:  10/28/24    Expected End:  11/11/24    Resolved:  12/18/24         achieve 4+/5 strength of the gluts without pain for stairs and transfers       Start:  10/28/24    Expected End:  12/09/24              PLAN:  Advance running based exercises as carol.

## 2024-12-23 ENCOUNTER — TREATMENT (OUTPATIENT)
Dept: PHYSICAL THERAPY | Facility: CLINIC | Age: 54
End: 2024-12-23
Payer: COMMERCIAL

## 2024-12-23 DIAGNOSIS — M70.62 GREATER TROCHANTERIC BURSITIS OF LEFT HIP: Primary | ICD-10-CM

## 2024-12-23 PROCEDURE — 97110 THERAPEUTIC EXERCISES: CPT | Mod: GP | Performed by: PHYSICAL THERAPIST

## 2024-12-23 NOTE — PROGRESS NOTES
Physical Therapy Treatment    Patient Name: Korinne Dee Peetz  MRN: [id number]  Today's Date: 12/23/2024  Visit 7/20  DOS/DOI:  8/1/24  Referred by:   Miguel Jaquez  Time Calculation  Start Time: 1002  Stop Time: 1052  Time Calculation (min): 50 min     PT Therapeutic Procedures Time Entry  Therapeutic Exercise Time Entry: 50                 Diagnosis:   1. Greater trochanteric bursitis of left hip  Follow Up In Physical Therapy    Follow Up In Physical Therapy            PRECAUTIONS:  none    SUBJECTIVE:  0/10 pain.  She stacked 100 thais of hay today and had no trouble.  No problem after her last rx.    HEP compliance: yes    OBJECTIVE:  - she is able to stabilize and maintain knee/pelvic position in SLS positions.  - note R knee genu valgum occurs during impact when doing a light jump and hold from the L leg to the R (to simulate the impact of landing in a SLS position when running).  With cueing this improves.    Treatment:  - continued with exercises simulating running while keeping the core engaged and the pelvis and knees in proper alignment.  Increased reps as our ultimate focus will be endurance since she wants to return running on a trail 2-5 miles.  Therapeutic Exercise  Therapeutic Exercise Activity 1: SLS on step with opposite LE toe tap, x24 B  Therapeutic Exercise Activity 2: lunge with knee drive, 2x10 B  Therapeutic Exercise Activity 3: SLS with running motion using cables, 50 lbs, x24 B  Therapeutic Exercise Activity 4: SLS step up with B UE and opposite hip ext, 30 lbs, 2x12 B  Therapeutic Exercise Activity 5: SLS med ball toss on airex, red ball, x10 B  Therapeutic Exercise Activity 6: forward jump from leg to the other and holding to stabilize the knee and pelvis during a running motion, x25 B  Therapeutic Exercise Activity 7: 1 leg bridge, 2x10 B  Therapeutic Exercise Activity 9: hip ER with ext at 45 degree angle, B, 2x12  Therapeutic Exercise Activity 10: hi pabd/ext on multi hip, L3, 2x12  B  Therapeutic Exercise Activity 12: NuStep L1, 5 min                ASSESSMENT:  Chanel rx well.  She is able to hold her pelvis and knee in proper alignment during static/positional exercise that simulates running.  However, in more dynamic exercise simulating running she cannot keep the R knee in proper alignment (hx of R YEVGENIY).  She will benefit from continued PT to work on strength and LE alignment during dynamic exercise that simulate running so she can return to running with proper form to prevent future issues.      PLAN:  Progress lunge with knee drive to include a hop  Add bounding and clock jumps

## 2024-12-26 ENCOUNTER — APPOINTMENT (OUTPATIENT)
Dept: PHYSICAL THERAPY | Facility: CLINIC | Age: 54
End: 2024-12-26
Payer: COMMERCIAL

## 2025-01-08 ENCOUNTER — APPOINTMENT (OUTPATIENT)
Dept: PHYSICAL THERAPY | Facility: CLINIC | Age: 55
End: 2025-01-08
Payer: COMMERCIAL

## 2025-01-14 ENCOUNTER — TREATMENT (OUTPATIENT)
Dept: PHYSICAL THERAPY | Facility: CLINIC | Age: 55
End: 2025-01-14
Payer: COMMERCIAL

## 2025-01-14 DIAGNOSIS — M70.62 GREATER TROCHANTERIC BURSITIS OF LEFT HIP: Primary | ICD-10-CM

## 2025-01-14 PROCEDURE — 97110 THERAPEUTIC EXERCISES: CPT | Mod: GP | Performed by: PHYSICAL THERAPIST

## 2025-01-14 NOTE — PROGRESS NOTES
Physical Therapy Treatment    Patient Name: Korinne Dee Peetz  MRN: [id number]  Today's Date: 1/14/2025  Visit 8/20  DOS/DOI:  8/1/24  Referred by:   Miguel Jaquez     PT Therapeutic Procedures Time Entry  Therapeutic Exercise Time Entry: 42                 Diagnosis:   1. Greater trochanteric bursitis of left hip              PRECAUTIONS:  none    SUBJECTIVE:  She has done 3 one mile runs on a treadmill without pain during or soreness afterward.  She is aware of her form  HEP compliance: yes    OBJECTIVE:  - MMT L glut min  = 4+/5, med = 4-/5, max = 5/5  - L hip IR/ER MMT = 4+/5    Treatment:  - continued with exercises that simulate running motions.  Emphasis on form and stabilizing the pelvic, hip and knee on landing.Added bounding in place and clock jumps.  Advanced lunge with knee drive to include a hop.  Therapeutic Exercise  Therapeutic Exercise Activity 1: SLS on step with opposite LE toe tap, x24 B  Therapeutic Exercise Activity 2: lunge with knee drive with hop, 2x10 B  Therapeutic Exercise Activity 3: SLS with running motion using cables, 50 lbs, x24 B  Therapeutic Exercise Activity 4: SLS step up with B UE and opposite hip ext, 30 lbs, 24 B  Therapeutic Exercise Activity 5: SLS med ball toss on airex, red ball, 2x10 B  Therapeutic Exercise Activity 6: clock jumps x5  Therapeutic Exercise Activity 7: 1 leg bridge, 2x10 B  Therapeutic Exercise Activity 8: bounding B 2x10  Therapeutic Exercise Activity 10: hi pabd/ext on multi hip, L3, 2x12 B  Therapeutic Exercise Activity 11: lateral step, front to back, GN TB 10 ft x 4  Therapeutic Exercise Activity 12: NuStep L1, 5 min              - she was given bounding 2x10, clock jumps x5 and knee drive with hop 2x10 to add to her HEP.    ASSESSMENT:  Chanel rx well.  No pain with any exercise though she was fatigued.  She does have decreased coordination and stability with advanced exercises.  She has returned to running up to 1 mile without symptoms.  She will  benefit from continued therapy to further improve stability of the L LE during more advanced running motions.    PLAN:  Continue with current exercises.  Increase reps as carol.

## 2025-01-20 ENCOUNTER — APPOINTMENT (OUTPATIENT)
Dept: PRIMARY CARE | Facility: CLINIC | Age: 55
End: 2025-01-20
Payer: COMMERCIAL

## 2025-01-21 ENCOUNTER — TREATMENT (OUTPATIENT)
Dept: PHYSICAL THERAPY | Facility: CLINIC | Age: 55
End: 2025-01-21
Payer: COMMERCIAL

## 2025-01-21 DIAGNOSIS — M70.62 GREATER TROCHANTERIC BURSITIS OF LEFT HIP: Primary | ICD-10-CM

## 2025-01-21 PROCEDURE — 97110 THERAPEUTIC EXERCISES: CPT | Mod: GP | Performed by: PHYSICAL THERAPIST

## 2025-01-21 NOTE — PROGRESS NOTES
Physical Therapy Treatment    Patient Name: Korinne Dee Peetz  MRN: [id number]  Today's Date: 1/21/2025  Visit 9/20  DOS/DOI:  8/1/24  Referred by:   Miguel Jaquez  Time Calculation  Start Time: 1404  Stop Time: 1446  Time Calculation (min): 42 min     PT Therapeutic Procedures Time Entry  Therapeutic Exercise Time Entry: 42                 Diagnosis:   1. Greater trochanteric bursitis of left hip              PRECAUTIONS:  none    SUBJECTIVE:  She reports general aching throughout her body.  Possibly due to a rheumatology dx but hasn't been officially dx.  Nothing specific to her L hip.  She took 800 mg of motrin and has no pain now.  She did well after her last rx.  She has not tried running since her last session.    HEP compliance: yes    OBJECTIVE:  - MMT L glut met = 4/5    Treatment:  - continued with exercises to simulate running while keeping the pelvis, hip and knee in alignment.  Progressed exercise as carol.  Added bridge with hip ER.  Therapeutic Exercise  Therapeutic Exercise Activity 1: SLS on step with opposite knee drive B, 2x12  Therapeutic Exercise Activity 2: lunge with knee drive with hop, 2x12 B  Therapeutic Exercise Activity 3: SLS with running motion using cables, 50 lbs, x24 B  Therapeutic Exercise Activity 4: SLS step up with B UE and opposite hip ext, 30 lbs, 24 B  Therapeutic Exercise Activity 5: SLS med ball toss on airex, red ball, 2x12 B  Therapeutic Exercise Activity 6: clock jumps x5  Therapeutic Exercise Activity 7: 1 leg bridge, 2x10 B  Therapeutic Exercise Activity 8: bounding B 2x10  Therapeutic Exercise Activity 9: bridge with hip ER B, 2x12  Therapeutic Exercise Activity 10: hip abd/ext on multi hip, L3, 2x12 B  Therapeutic Exercise Activity 11: lateral step, front to back, GN TB 10 ft x 4  Therapeutic Exercise Activity 12: NuStep L1, 5 min                ASSESSMENT:  Carol rx well - she was challenged but did not have any pain.  She had difficulty maintaining form with more  advanced exercises as expected.  She is to return to running after the general aching in her body resolves and gradually increase her distance/time.      PLAN:  Return in 2-3 weeks for discharge if she is able to run more than 1 mile without pain.

## 2025-02-24 ENCOUNTER — DOCUMENTATION (OUTPATIENT)
Dept: PHYSICAL THERAPY | Facility: CLINIC | Age: 55
End: 2025-02-24
Payer: COMMERCIAL

## 2025-02-24 NOTE — PROGRESS NOTES
Physical Therapy    Discharge Summary    Name: Korinne Dee Peetz  MRN: [id number]  : 1970  Date: 25    Discharge Summary: PT    Discharge Information: Date of discharge 25, Date of last visit 25, Date of evaluation 10/28/24, Number of attended visits 9, Referred by Miguel Jqauez, and Referred for Greater trochanteric bursitis of the L hip    Therapy Summary: at her last rx she stated that she had no pain specific to her L hip.  She had done 3 x 1 mile runs on a treadmill without symptoms.  She demonstrated:    - MMT L glut met = 4/5   - L hip AROM flex = 128, abd = 40, ER/IR/ADD = WFL   - Negative step down test. No pain and good pelvic/knee position   - amb with a normal gait.    She was progressing well and was gradually increasing her running time.  She did not return to PT.    Rehab Discharge Reason: Failed to schedule and/or keep follow-up appointment(s)

## 2025-04-04 DIAGNOSIS — M17.11 PRIMARY OSTEOARTHRITIS OF RIGHT KNEE: Primary | ICD-10-CM

## 2025-04-07 RX ORDER — AMOXICILLIN 500 MG/1
CAPSULE ORAL
Qty: 4 CAPSULE | Refills: 5 | Status: SHIPPED | OUTPATIENT
Start: 2025-04-07

## 2025-04-18 ENCOUNTER — APPOINTMENT (OUTPATIENT)
Dept: ORTHOPEDIC SURGERY | Facility: CLINIC | Age: 55
End: 2025-04-18
Payer: COMMERCIAL

## 2025-05-06 ENCOUNTER — APPOINTMENT (OUTPATIENT)
Dept: PRIMARY CARE | Facility: CLINIC | Age: 55
End: 2025-05-06
Payer: COMMERCIAL

## 2025-06-30 DIAGNOSIS — Z12.11 SPECIAL SCREENING FOR MALIGNANT NEOPLASMS, COLON: ICD-10-CM

## 2025-06-30 RX ORDER — SODIUM, POTASSIUM,MAG SULFATES 17.5-3.13G
SOLUTION, RECONSTITUTED, ORAL ORAL
Qty: 1 EACH | Refills: 0 | Status: SHIPPED | OUTPATIENT
Start: 2025-06-30

## 2025-07-01 ENCOUNTER — APPOINTMENT (OUTPATIENT)
Dept: ORTHOPEDIC SURGERY | Facility: CLINIC | Age: 55
End: 2025-07-01
Payer: COMMERCIAL

## 2025-07-02 ENCOUNTER — HOSPITAL ENCOUNTER (OUTPATIENT)
Dept: RADIOLOGY | Facility: CLINIC | Age: 55
Discharge: HOME | End: 2025-07-02
Payer: COMMERCIAL

## 2025-07-02 ENCOUNTER — OFFICE VISIT (OUTPATIENT)
Dept: ORTHOPEDIC SURGERY | Facility: CLINIC | Age: 55
End: 2025-07-02
Payer: COMMERCIAL

## 2025-07-02 VITALS — BODY MASS INDEX: 25.44 KG/M2 | HEIGHT: 64 IN | WEIGHT: 149 LBS

## 2025-07-02 DIAGNOSIS — M25.551 RIGHT HIP PAIN: ICD-10-CM

## 2025-07-02 PROCEDURE — 1036F TOBACCO NON-USER: CPT | Performed by: ORTHOPAEDIC SURGERY

## 2025-07-02 PROCEDURE — 73502 X-RAY EXAM HIP UNI 2-3 VIEWS: CPT | Mod: RT

## 2025-07-02 PROCEDURE — 2500000004 HC RX 250 GENERAL PHARMACY W/ HCPCS (ALT 636 FOR OP/ED): Performed by: ORTHOPAEDIC SURGERY

## 2025-07-02 PROCEDURE — 99202 OFFICE O/P NEW SF 15 MIN: CPT | Mod: 25

## 2025-07-02 PROCEDURE — 3008F BODY MASS INDEX DOCD: CPT | Performed by: ORTHOPAEDIC SURGERY

## 2025-07-02 PROCEDURE — 20610 DRAIN/INJ JOINT/BURSA W/O US: CPT | Mod: RT | Performed by: ORTHOPAEDIC SURGERY

## 2025-07-02 PROCEDURE — 73502 X-RAY EXAM HIP UNI 2-3 VIEWS: CPT | Mod: RIGHT SIDE | Performed by: RADIOLOGY

## 2025-07-02 PROCEDURE — 99214 OFFICE O/P EST MOD 30 MIN: CPT | Performed by: ORTHOPAEDIC SURGERY

## 2025-07-02 RX ORDER — TRIAMCINOLONE ACETONIDE 40 MG/ML
1 INJECTION, SUSPENSION INTRA-ARTICULAR; INTRAMUSCULAR
Status: COMPLETED | OUTPATIENT
Start: 2025-07-02 | End: 2025-07-02

## 2025-07-02 RX ADMIN — TRIAMCINOLONE ACETONIDE 1 ML: 40 INJECTION, SUSPENSION INTRA-ARTICULAR; INTRAMUSCULAR at 08:42

## 2025-07-02 ASSESSMENT — PAIN SCALES - GENERAL: PAINLEVEL_OUTOF10: 4

## 2025-07-02 ASSESSMENT — COLUMBIA-SUICIDE SEVERITY RATING SCALE - C-SSRS
6. HAVE YOU EVER DONE ANYTHING, STARTED TO DO ANYTHING, OR PREPARED TO DO ANYTHING TO END YOUR LIFE?: NO
2. HAVE YOU ACTUALLY HAD ANY THOUGHTS OF KILLING YOURSELF?: NO
1. IN THE PAST MONTH, HAVE YOU WISHED YOU WERE DEAD OR WISHED YOU COULD GO TO SLEEP AND NOT WAKE UP?: NO

## 2025-07-02 ASSESSMENT — PAIN - FUNCTIONAL ASSESSMENT: PAIN_FUNCTIONAL_ASSESSMENT: 0-10

## 2025-07-02 NOTE — PROGRESS NOTES
This is a consultation from ANTONIO Garcia-CNP for   Chief Complaint   Patient presents with    Right Hip - Pain       This is a 54 y.o. female who presents for evaluation of right hip pain.  Patient had a right total hip several years ago, this is a new issue with it.  She feels sharp pain over the lateral aspect of the hip.  This been bothering her for few months this is new problem.  No drainage from incision no fevers no chills.  No pain going down her leg.  She has never had injections in the side of the hip.  She has tried over-the-counter anti-inflammatories and stretching exercises she had done for her left side.    Physical Exam    There has been no interval change in this patient's past medical, surgical, medications, allergies, family history or social history since the most recent visit to a provider within our department. 14 point review of systems was performed, reviewed, and negative except for pertinent positives documented in the history of present illness.     Constitutional: well developed, well nourished female in no acute distress  Psychiatric: normal mood, appropriate affect  Eyes: sclera anicteric  HENT: normocephalic/atraumatic  CV: regular rate and rhythm   Respiratory: non labored breathing  Integumentary: no rash  Neurological: moves all extremities    Right hip examination: Well-healed surgical incision erythema no drainage, tender to palpation over the greater trochanter.  No pain with range of motion neurovascular intact distally    Imaging:  Xrays were ordered by me, they were reviewed and independently interpreted by me today, they show stable right total hip arthroplasty no evidence of any fracture loosening or dislocation    L Inj/Asp: R greater trochanteric bursa on 7/2/2025 8:42 AM  Indications: pain  Details: 22 G (Spinal needle  ) needle, lateral approach  Medications: 1 mL triamcinolone acetonide 40 mg/mL      Greater Trochanteric bursitis cortisone injection  procedure note:  Discussion:  I discussed the conservative treatment options for Greater Trochanteric Bursitis including but not limited to physical therapy, oral NSAIDS, activity and lifestyle modification, and corticosteroid injections. Pt has elected to try a cortisone injection today. I have explained the risk and benefits of an injection including the possibility of joint infection, bleeding, damage to cartilage, allergic reaction. Patient verbalized understanding and gave verbal consent wishes to proceed with a intraarticular cortisone injection for their peribursal area. We discussed the risks and benefits and potential morbidity related to the treatment, and to the prescription medication administered in the injection    Procedure    With the patient's informed verbal consent, the right hip greater trochanteric area was prepped in standard sterile fashion with Chlorhexidine in a sidelying position. The skin was then anesthetized with ethyl chloride spray and cleaned again with Chlorhexidine. The peritrochanteric soft tissue was then injected with a prefilled spinal needle syringe of 40 mg Kenalog +4 ml Lidocaine without complications.  The patient tolerated this well and felt immediate initial relief of symptoms. A bandaid was applied and the patient ambulated out of the clinic on ther own accord without difficulty. Patient should contact the office if any signs of of infection appear: redness, fever, chills, drainage, swelling or warmth to the hip.  Pt understands that the injections can be repeated no sooner than 3 months.    Procedure, treatment alternatives, risks and benefits explained, specific risks discussed. Consent was given by the patient. Immediately prior to procedure a time out was called to verify the correct patient, procedure, equipment, support staff and site/side marked as required. Patient was prepped and draped in the usual sterile fashion.             Impression/Plan: This is a 54 y.o.  "female status post right total hip with greater trochanteric pain syndrome.  I discussed the risks and benefits of the various treatment options with the patient in detail, treatments for greater trochanteric pain syndrome include use of oral anti-inflammatory medications, use of a cane in the opposite hand, physical therapy, activity modification, and cortisone injection.  Will get her set up with PT she will continue exercises.  I will see her back as needed    BMI Readings from Last 1 Encounters:   07/02/25 25.58 kg/m²      Lab Results   Component Value Date    CREATININE 0.60 04/25/2024     Tobacco Use: Low Risk  (7/2/2025)    Patient History     Smoking Tobacco Use: Never     Smokeless Tobacco Use: Never     Passive Exposure: Never      Computed MELD 3.0 unavailable. One or more values for this score either were not found within the given timeframe or did not fit some other criterion.  Computed MELD-Na unavailable. One or more values for this score either were not found within the given timeframe or did not fit some other criterion.       No results found for: \"HGBA1C\"  Lab Results   Component Value Date    STAPHMRSASCR NO Staphylococcus aureus ISOLATED. 05/05/2023     "

## 2025-07-10 DIAGNOSIS — K51.90 ULCERATIVE COLITIS, UNSPECIFIED, WITHOUT COMPLICATIONS (MULTI): ICD-10-CM

## 2025-07-10 RX ORDER — BALSALAZIDE DISODIUM 750 MG/1
2250 CAPSULE ORAL 3 TIMES DAILY
Qty: 810 CAPSULE | Refills: 0 | Status: SHIPPED | OUTPATIENT
Start: 2025-07-10

## 2025-08-19 ENCOUNTER — APPOINTMENT (OUTPATIENT)
Dept: ORTHOPEDIC SURGERY | Facility: CLINIC | Age: 55
End: 2025-08-19
Payer: COMMERCIAL

## 2025-08-19 ENCOUNTER — HOSPITAL ENCOUNTER (OUTPATIENT)
Dept: RADIOLOGY | Facility: HOSPITAL | Age: 55
Discharge: HOME | End: 2025-08-19
Payer: COMMERCIAL

## 2025-08-19 DIAGNOSIS — M25.562 PAIN IN BOTH KNEES, UNSPECIFIED CHRONICITY: ICD-10-CM

## 2025-08-19 DIAGNOSIS — M25.561 PAIN IN BOTH KNEES, UNSPECIFIED CHRONICITY: ICD-10-CM

## 2025-08-19 PROCEDURE — 73564 X-RAY EXAM KNEE 4 OR MORE: CPT | Mod: 50

## 2025-08-19 PROCEDURE — 1036F TOBACCO NON-USER: CPT | Performed by: ORTHOPAEDIC SURGERY

## 2025-08-19 PROCEDURE — 99214 OFFICE O/P EST MOD 30 MIN: CPT | Performed by: ORTHOPAEDIC SURGERY

## 2025-08-19 PROCEDURE — 73564 X-RAY EXAM KNEE 4 OR MORE: CPT | Mod: BILATERAL PROCEDURE | Performed by: RADIOLOGY

## 2025-08-19 ASSESSMENT — PAIN - FUNCTIONAL ASSESSMENT: PAIN_FUNCTIONAL_ASSESSMENT: 0-10

## 2025-08-19 ASSESSMENT — PAIN SCALES - GENERAL: PAINLEVEL_OUTOF10: 3

## 2025-09-26 ENCOUNTER — APPOINTMENT (OUTPATIENT)
Dept: GASTROENTEROLOGY | Facility: EXTERNAL LOCATION | Age: 55
End: 2025-09-26
Payer: COMMERCIAL